# Patient Record
Sex: MALE | Race: WHITE | NOT HISPANIC OR LATINO | Employment: FULL TIME | URBAN - METROPOLITAN AREA
[De-identification: names, ages, dates, MRNs, and addresses within clinical notes are randomized per-mention and may not be internally consistent; named-entity substitution may affect disease eponyms.]

---

## 2017-01-03 ENCOUNTER — OFFICE VISIT (OUTPATIENT)
Dept: OPHTHALMOLOGY | Facility: CLINIC | Age: 52
End: 2017-01-03
Attending: OPHTHALMOLOGY
Payer: COMMERCIAL

## 2017-01-03 DIAGNOSIS — Z94.7 CORNEA REPLACED BY TRANSPLANT: ICD-10-CM

## 2017-01-03 PROCEDURE — 99213 OFFICE O/P EST LOW 20 MIN: CPT | Mod: ZF

## 2017-01-03 PROCEDURE — 92025 CPTRIZED CORNEAL TOPOGRAPHY: CPT | Mod: ZF | Performed by: OPHTHALMOLOGY

## 2017-01-03 ASSESSMENT — SLIT LAMP EXAM - LIDS
COMMENTS: NORMAL
COMMENTS: NORMAL

## 2017-01-03 ASSESSMENT — REFRACTION_WEARINGRX
OS_SPHERE: -10.25
OD_CYLINDER: +3.75
OD_AXIS: 170
OD_SPHERE: -9.00
SPECS_TYPE: PAL
OS_CYLINDER: +5.50
OS_AXIS: 176
OD_ADD: +2.00
OS_ADD: +2.00

## 2017-01-03 ASSESSMENT — VISUAL ACUITY
CORRECTION_TYPE: GLASSES
OS_CC: 20/500
METHOD: SNELLEN - LINEAR
OD_CC: 20/25

## 2017-01-03 ASSESSMENT — TONOMETRY
OD_IOP_MMHG: 17
IOP_METHOD: ICARE
OS_IOP_MMHG: 17

## 2017-01-03 ASSESSMENT — CONF VISUAL FIELD
OD_NORMAL: 1
OS_NORMAL: 1

## 2017-01-03 ASSESSMENT — EXTERNAL EXAM - RIGHT EYE: OD_EXAM: NORMAL

## 2017-01-03 ASSESSMENT — EXTERNAL EXAM - LEFT EYE: OS_EXAM: NORMAL

## 2017-01-03 NOTE — PROGRESS NOTES
Cc:   Chief Complaints and History of Present Illnesses   Patient presents with     Post Op (Ophthalmology) Left Eye     S/p penetrating keratoplasty (PK) left eye 12/30/15     HPI: Paulino Gomez is a 51 year old male who presents with hx of keratoconus now s/p PKP OS (12/30/15)    POHx:  -s/p PKP OS for Keratoconus (12/30/15)    Current Meds:   -prednisolone 1% gtt OS qday  -preservative free artificial tears    Assessment & Plan   Paulino Gomez is a 51 year old male with the following diagnoses:     -s/p PKP OS 12/2015 as above  - wound dehiscence with suture removal, new sutures added 8/30  - K freddie with steepening superonasally; area of re-suturing superotemporally no significant cyl  - continue prednisone once a day  - remove 3 superonasal suts today  - ofloxacin QID x4 days     Revisit in 6 weeks, freddie    Once satisfied with freddie get ball rolling for phacoemulsification / intraocular lens - biometry etc      Ariel Victoria MD MPH   Ophthalmology Resident PGY-3        ~~~~~~~~~~~~~~~~~~~~~~~~~~~~~~~~~~~~~~~~~~~~~~~~~~~~~~~~~~~~~~~~    I have personally examined the patient and agree with the assessment and plan as delineated by the resident / fellow.  I have confirmed the contents of the chief complaint, history of present illness, review of systems, and medical / surgical history sections and edited the note as needed.    Reginald Cabezas MD, MA  Director, Cornea & Anterior Segment  [facility] Department of Ophthalmology & Visual Neuroscience

## 2017-01-03 NOTE — MR AVS SNAPSHOT
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      Your next 10 appointments already scheduled     2017  7:30 AM   RETURN CORNEA with Reginald Cabezas MD   Eye Clinic ([facility])    [address]  [address] [address]  [facility] [address]   [phone]              Who to contact     Please call your clinic at [phone] to:    Ask questions about your health    Make or cancel appointments    Discuss your medicines    Learn about your test results    Speak to your doctor   If you have compliments or concerns about an experience at your clinic, or if you wish to file a complaint, please contact [facility] Patient Relations at [phone] or email us at [email or web address]         Additional Information About Your Visit        MyChart Information     TicketForEventt is an electronic gateway that provides easy, online access to your medical records. With [employer], you can request a clinic appointment, read your test results, renew a prescription or communicate with your care team.     To sign up for TicketForEventt visit the website at [email or web address]   You will be asked to enter the access code listed below, as well as some personal information. Please follow the directions to create your username and password.     Your access code is: [email or web address]  Expires: 3/20/2017  9:00 AM     Your access code will  in 90 days. If you need help or a new code, please contact your [facility] or call  [phone] for assistance.         Blood Pressure from Last 3 Encounters:   02/13/14 113/72   01/30/13 128/82   11/26/12 120/80    Weight from Last 3 Encounters:   02/13/14 92.987 kg (205 lb)   01/30/13 98.975 kg (218 lb 3.2 oz)   11/26/12 103.42 kg (228 lb)              We Performed the Following     Corneal Topography OS (left eye)        Primary Care Provider Office Phone # Fax #    Reese Moser [phone]       [facility] [address]  [facility] [address]        Thank you!     Thank you for choosing EYE CLINIC  for your care. Our goal is always to provide you with excellent care. Hearing back from our patients is one way we can continue to improve our services. Please take a few minutes to complete the written survey that you may receive in the mail after your visit with us. Thank you!             Your Updated Medication List - Protect others around you: Learn how to safely use, store and throw away your medicines at www.disposemymeds.org.          This list is accurate as of: 1/3/17  8:55 AM.  Always use your most recent med list.                   Brand Name Dispense Instructions for use    acetaZOLAMIDE 500 MG 12 hr capsule    DIAMOX SEQUEL    1 capsule    Take 1 capsule (500 mg) by mouth 2 times daily       aspirin 81 MG tablet      Take 1 tablet by mouth daily.       carboxymethylcellul-glycerin 0.5-0.9 % Soln ophthalmic solution    OPTIVE/REFRESH OPTIVE     1 drop as needed       FISH OIL PO      Take 1 tablet by mouth daily       MULTIVITAMIN PO      Take 1 tablet by mouth daily       ofloxacin 0.3 % ophthalmic solution    OCUFLOX    1 Bottle    Place 1 drop Into the left eye 2 times daily       ondansetron 4 MG tablet    ZOFRAN    1 tablet    Take 1 tablet (4 mg) by mouth once as needed for nausea or vomiting       * prednisoLONE acetate 1 % ophthalmic susp    PRED FORTE     Place 1 drop Into the left eye 2 times daily       * prednisoLONE acetate 1 % ophthalmic  susp    PRED FORTE     Place 1 drop Into the left eye daily       simvastatin 40 MG tablet    ZOCOR         VITAMIN C PO      Take 1 tablet by mouth daily       * Notice:  This list has 2 medication(s) that are the same as other medications prescribed for you. Read the directions carefully, and ask your doctor or other care provider to review them with you.

## 2017-01-06 ENCOUNTER — TELEPHONE (OUTPATIENT)
Dept: OPHTHALMOLOGY | Facility: CLINIC | Age: 52
End: 2017-01-06

## 2017-01-06 DIAGNOSIS — Z94.7 HISTORY OF PENETRATING KERATOPLASTY: Primary | ICD-10-CM

## 2017-01-06 NOTE — TELEPHONE ENCOUNTER
Reviewed with cornea clinic  Pt scheduled tomorrow with dr. Broadbent  Pt to increase preservative free artificial tears and may try lubricating eye ointment at night (does not wear contact)  Pt seems satisfied with conversation  Wiliam Galindo RN 10:25 AM 01/06/2017

## 2017-01-06 NOTE — TELEPHONE ENCOUNTER
----- Message from Ebony Hall sent at 1/6/2017  8:44 AM CST -----  Regarding: Eye Redness and Irritation- Call Back  Contact: [phone]  Pt requesting a call back. He stated that since his appt with Dr. Cabezas on 1/3/17, he has been experiencing redness and irritation in his eye and fears cornea rejection. Pt would like to speak with you about this to find out what his next step should be. Thanks.    KP    Please DO NOT send this message and/or reply back to sender.  Call Center Representatives DO NOT respond to messages.

## 2017-01-06 NOTE — TELEPHONE ENCOUNTER
Pt calling with new left eye symptoms since eye last eye visit 3 days ago.  Sutures removed at that visit  Redness/irritation reported  Called left message with direct triage number  Forwarded this note for future addendum after speaking with pt  Wiliam Galindo RN 9:49 AM 01/06/2017

## 2017-01-06 NOTE — TELEPHONE ENCOUNTER
Sutures removed in past caused dehiscence in august this year  First time sutures removed since adding more suture to cornea transplant after dehiscence.  Left eye redness/tearing/no pain, but irritated/no new photophobia, vision about the same, a little scratchy.  Will review with facilitator and call pt back with plan of care  Wiliam Galindo RN 10:04 AM 01/06/2017

## 2017-01-07 ENCOUNTER — OFFICE VISIT (OUTPATIENT)
Dept: OPHTHALMOLOGY | Facility: CLINIC | Age: 52
End: 2017-01-07

## 2017-01-07 DIAGNOSIS — S05.8X2A SUPERFICIAL INJURY OF CORNEA, LEFT, INITIAL ENCOUNTER: ICD-10-CM

## 2017-01-07 DIAGNOSIS — Z94.7 CORNEA REPLACED BY TRANSPLANT: Primary | ICD-10-CM

## 2017-01-07 ASSESSMENT — TONOMETRY
OS_IOP_MMHG: 15
OD_IOP_MMHG: 17
IOP_METHOD: ICARE

## 2017-01-07 ASSESSMENT — VISUAL ACUITY
METHOD: SNELLEN - LINEAR
CORRECTION_TYPE: GLASSES
OS_CC: 6'/200 E
OD_CC: 20/25+2

## 2017-01-07 ASSESSMENT — SLIT LAMP EXAM - LIDS
COMMENTS: NORMAL
COMMENTS: NORMAL

## 2017-01-07 ASSESSMENT — EXTERNAL EXAM - RIGHT EYE: OD_EXAM: NORMAL

## 2017-01-07 ASSESSMENT — EXTERNAL EXAM - LEFT EYE: OS_EXAM: NORMAL

## 2017-01-07 NOTE — PROGRESS NOTES
Chief Complaints and History of Present Illnesses   Patient presents with     Eye Problem Left Eye          He presents for evaluation of his left eye.  He has a history of a corneal transplant.  In August of 2016 he has sutures removed and then had a dehiscence at that time.  This was repaired and he was doing well.  He saw Dr. Cabezas earlier this week and had some sutures removed.  He reports that since the sutures were removed the eye has been red and irritated.  He has been having a lot of tears.  He notes that this was worse over the last several days but this morning seems to be a little bit better.      His vision tends to flucuate some and he has not noticed any significant difference in his vision.      He is on pred forte once a day OS  Ocuflox QID OS  Tears OS    He has also had a retinal detachment on the left side repaired x2.      Assessment & Plan     Paulino Gomez is a 51 year old male with the following diagnoses:   1. 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 Superficial injury of cornea, left, initial encounter       Small abrasions almost totally healed superiorly.  Likely from his previous suture removal.  Otherwise his exam is normal with a white and quiet eye and clear, intact transplant.      PLAN:  - Continue pred forte once daily left eye  - Continue ocuflox QID left eye for 3 more days then stop  - Continue lubrication  - If his symptoms have resolved than keep scheduled follow up with Dr. Cabezas in 6 weeks, if he is still having trouble next week than he will call back.           Attending Physician Attestation:  I have seen and examined this patient.  I have confirmed and edited as necessary the chief complaint(s), history of present illness, review of systems, relevant history, and examination findings as documented by others.  I have personally reviewed the relevant tests, images, and reports as documented above.  I have confirmed and edited as necessary the assessment and plan and agree with  this note.    - Talmage Broadbent MD, PhD 10:10 AM 1/7/2017

## 2017-01-07 NOTE — NURSING NOTE
Chief Complaints and History of Present Illnesses   Patient presents with     Eye Problem Left Eye     HPI    Affected eye(s):  Left   Symptoms:     Blurred vision   Redness   Tearing   Photophobia         Do you have eye pain now?:  No      Comments:  Had sutures removed 1/3/17. Lots of tearing, redness and blurry LE, better this AM. Has been using ofloxacin qid, pred qd and AT prn     Routine labs ordered; CBC, comprehensive metabolic panel, fasting lipid panel, urinalysis.

## 2017-01-08 RX ORDER — PREDNISOLONE ACETATE 10 MG/ML
1 SUSPENSION/ DROPS OPHTHALMIC DAILY
Qty: 5 ML | Refills: 11 | Status: SHIPPED | OUTPATIENT
Start: 2017-01-08 | End: 2017-11-02 | Stop reason: DRUGHIGH

## 2017-02-21 ENCOUNTER — OFFICE VISIT (OUTPATIENT)
Dept: OPHTHALMOLOGY | Facility: CLINIC | Age: 52
End: 2017-02-21
Attending: OPHTHALMOLOGY
Payer: COMMERCIAL

## 2017-02-21 DIAGNOSIS — Z94.7 S/P PKP (PENETRATING KERATOPLASTY): Primary | ICD-10-CM

## 2017-02-21 PROCEDURE — 92025 CPTRIZED CORNEAL TOPOGRAPHY: CPT | Mod: ZF | Performed by: OPHTHALMOLOGY

## 2017-02-21 PROCEDURE — 99213 OFFICE O/P EST LOW 20 MIN: CPT | Mod: ZF

## 2017-02-21 ASSESSMENT — TONOMETRY
IOP_METHOD: ICARE
OD_IOP_MMHG: 25
OS_IOP_MMHG: 18

## 2017-02-21 ASSESSMENT — VISUAL ACUITY
OS_CC: 3'/200 E
OD_CC: 20/25
CORRECTION_TYPE: GLASSES
OD_CC+: -2+2
METHOD: SNELLEN - LINEAR

## 2017-02-21 ASSESSMENT — REFRACTION_WEARINGRX
OS_SPHERE: -10.25
OS_AXIS: 176
SPECS_TYPE: PAL
OD_CYLINDER: +3.75
OD_SPHERE: -9.00
OS_ADD: +2.00
OD_AXIS: 170
OD_ADD: +2.00
OS_CYLINDER: +5.50

## 2017-02-21 ASSESSMENT — SLIT LAMP EXAM - LIDS
COMMENTS: NORMAL
COMMENTS: NORMAL

## 2017-02-21 ASSESSMENT — EXTERNAL EXAM - RIGHT EYE: OD_EXAM: NORMAL

## 2017-02-21 ASSESSMENT — EXTERNAL EXAM - LEFT EYE: OS_EXAM: NORMAL

## 2017-02-21 NOTE — PROGRESS NOTES
Cc:   Chief Complaints and History of Present Illnesses   Patient presents with     Post Op (Ophthalmology) Left Eye     S/p penetrating keratoplasty (PK) left eye 12/30/15     HPI: Paulino Gomez is a 51 year old male who presents with hx of keratoconus now s/p PKP OS (12/30/15)      Interval history: Saw Dr Broadbent in January for some increased redness and irritation after suture removal, graft looked reportedly clear and patient was extended on ofloxacin drops for a few days. Redness soon resolved and patient has done well since. Vision has mildly declined over time in both eyes, especially at near, at night, and in bright lighting conditions.      POHx:  -s/p PKP OS for Keratoconus (12/30/15)    Current Meds:   -prednisolone 1% gtt OS qday  -preservative free artificial tears    Assessment & Plan   Paulino Gomez is a 51 year old male with the following diagnoses:     -s/p PKP OS 12/2015 as above  - wound dehiscence with suture removal, new sutures added 8/30  -additional sutures removed 1/3/17- K freddie with steepening superiorly, improved from August 2016  - continue prednisone once a day    add'l sutures x 2 removed today      Significant PSC cataract OS  History of retinal detachments but 20/150 range vision at time of last retina visit with Dr Novak in Sept 2015    Plan for phacoemulsification / intraocular lens in April     Extended R/B/A   Plan for -6.50 target to prevent severe aniso with right eye    Topical, may need to suture wound    Check one final time for freddie etc late March, and do biometry that day if freddie good.    manifest refraction today right eye to help with work function       Jules Loya MD  PGY3, Dept of Ophthalmology      ~~~~~~~~~~~~~~~~~~~~~~~~~~~~~~~~~~~~~~~~~~~~~~~~~~~~~~~~~~~~~~~~    I have personally examined the patient and agree with the assessment and plan as delineated by the resident / fellow.  I have confirmed the contents of the chief complaint, history of  present illness, review of systems, and medical / surgical history sections and edited the note as needed.    Reginald Cabezas MD, MA  Director, Cornea & Anterior Segment  [facility] Department of Ophthalmology & Visual Neuroscience

## 2017-02-21 NOTE — NURSING NOTE
Chief Complaints and History of Present Illnesses   Patient presents with     Follow Up For     6 week f/u; freddie OS     HPI    Affected eye(s):  Left   Symptoms:     Decreased vision   Difficulty with reading      Duration:  6 weeks      Do you have eye pain now?:  No      Comments:  6 week f/u with freddie OS - stitches removed last visit (1/3/17)  Pt states his eye was red after stitches were removed - saw Dr. Broadbent for this  S/p PKP for keratoconus 2015  Night vision while driving appears to be worse, per patient - pt hadn't been driving for quite some time  Notes difficulty with reading and believes overall vision may be slightly worse  Pt does get a pounding headache/eye fatigue by the end of the day    Ocular meds:  Prednisone QD, left eye  PFAT's PRN    Radha Irvin COA 7:34 AM February 21, 2017

## 2017-02-21 NOTE — MR AVS SNAPSHOT
After Visit Summary   2/21/2017    Paulino Gomez    MRN: [id number]           Patient Information     Date Of Birth          1965        Visit Information        Provider Department      2/21/2017 7:30 AM Reginald Cabezas MD [facility]        Today's Diagnoses     S/P PKP (penetrating keratoplasty)    -  1    PSC (posterior subcapsular cataract)           Follow-ups after your visit        Follow-up notes from your care team     Return in about 4 weeks (around 3/21/2017) for topography os , biometry os .      Your next 10 appointments already scheduled     Mar 28, 2017  8:00 AM CDT   RETURN CORNEA with Reginald Cabezas MD   Eye Clinic ([facility])    [address]  [address]  [address]  [facility] [address] [phone]            Apr 11, 2017  1:30 PM CDT   Post-Op with Reginald Cabezas MD   Eye Clinic ([facility])    [address]  [address]  [address]  [facility] [id number] [phone]            Apr 18, 2017  7:45 AM CDT   Post-Op with Reginald Cabezas MD   Eye Clinic ([facility])    [address]  [address]  [address]  [facility] [address] [phone]              Future tests that were ordered for you today     Open Future Orders        Priority Expected Expires Ordered    Corneal Topography OS (left eye) Routine  8/21/2018 2/21/2017    IOL Biometry w/ IOL calc OU (both eye) Routine  8/21/2018 2/21/2017            Who to contact     Please call your clinic at [phone] to:    Ask questions about your health    Make or cancel appointments    Discuss your medicines    Learn about your test results    Speak to your doctor   If you have compliments or concerns about an experience at your clinic, or if you wish to file a complaint, please contact [facility] Patient Relations at [phone] or email us at [email or web address]          Additional Information About Your Visit        [employer]ala International Information     [employer] is an electronic gateway that provides easy, online access to your medical records. With [employer], you can request a clinic appointment, read your test results, renew a prescription or communicate with your care team.     To sign up for [employer] visit the website at [email or web address] or [email or web address]   You will be asked to enter the access code listed below, as well as some personal information. Please follow the directions to create your username and password.     Your access code is: [email or web address]  Expires: 3/20/2017  9:00 AM     Your access code will  in 90 days. If you need help or a new code, please contact your [facility] or call [phone] for assistance.        Care EveryWhere ID     This is your Care EveryWhere ID. This could be used by other organizations to access your [place] medical records  [id number]         Blood Pressure from Last 3 Encounters:   14 113/72   13 128/82   12 120/80    Weight from Last 3 Encounters:   14 93 kg (205 lb)   13 99 kg (218 lb 3.2 oz)   12 103.4 kg (228 lb)              We Performed the Following     Corneal Topography OS (left eye)     Dorota-Operative Worksheet        Primary Care Provider Office Phone # Fax #    Reese Moser [phone]       [facility] [address] [facility] [address]        Thank you!     Thank you for choosing EYE CLINIC  for your care. Our goal is always to provide you with excellent care. Hearing back from our patients is one way we can continue to improve our services. Please take a few minutes to complete the written survey that you may receive in the mail after your visit with us. Thank you!             Your Updated Medication List - Protect others around you: Learn how to safely use, store and throw away your medicines at www.disposemymeds.org.          This list  is accurate as of: 2/21/17  9:07 AM.  Always use your most recent med list.                   Brand Name Dispense Instructions for use    aspirin 81 MG tablet      Take 1 tablet by mouth daily.       carboxymethylcellul-glycerin 0.5-0.9 % Soln ophthalmic solution    OPTIVE/REFRESH OPTIVE     1 drop as needed       FISH OIL PO      Take 1 tablet by mouth daily       MULTIVITAMIN PO      Take 1 tablet by mouth daily       ofloxacin 0.3 % ophthalmic solution    OCUFLOX    1 Bottle    Place 1 drop Into the left eye 2 times daily       ondansetron 4 MG tablet    ZOFRAN    1 tablet    Take 1 tablet (4 mg) by mouth once as needed for nausea or vomiting       * prednisoLONE acetate 1 % ophthalmic susp    PRED FORTE     Place 1 drop Into the left eye 2 times daily       * prednisoLONE acetate 1 % ophthalmic susp    PRED FORTE     Place 1 drop Into the left eye daily       * prednisoLONE acetate 1 % ophthalmic susp    PRED FORTE    5 mL    Place 1 drop Into the left eye daily       simvastatin 40 MG tablet    ZOCOR         VITAMIN C PO      Take 1 tablet by mouth daily       * Notice:  This list has 3 medication(s) that are the same as other medications prescribed for you. Read the directions carefully, and ask your doctor or other care provider to review them with you.

## 2017-03-28 ENCOUNTER — OFFICE VISIT (OUTPATIENT)
Dept: OPHTHALMOLOGY | Facility: CLINIC | Age: 52
End: 2017-03-28
Attending: OPHTHALMOLOGY
Payer: COMMERCIAL

## 2017-03-28 DIAGNOSIS — Z94.7 S/P PKP (PENETRATING KERATOPLASTY): ICD-10-CM

## 2017-03-28 PROCEDURE — 92025 CPTRIZED CORNEAL TOPOGRAPHY: CPT | Mod: ZF | Performed by: OPHTHALMOLOGY

## 2017-03-28 PROCEDURE — 99213 OFFICE O/P EST LOW 20 MIN: CPT | Mod: 25,ZF

## 2017-03-28 ASSESSMENT — REFRACTION_WEARINGRX
OD_CYLINDER: +3.75
OS_ADD: +2.00
SPECS_TYPE: PAL
OS_SPHERE: -10.25
OD_SPHERE: -9.00
OD_AXIS: 170
OD_ADD: +2.00
OS_CYLINDER: +5.50
OS_AXIS: 176

## 2017-03-28 ASSESSMENT — TONOMETRY
OD_IOP_MMHG: 18
IOP_METHOD: ICARE
OS_IOP_MMHG: 17

## 2017-03-28 ASSESSMENT — VISUAL ACUITY
OS_CC: 3/200E
OD_CC+: -1
OD_CC: 20/25
CORRECTION_TYPE: GLASSES
METHOD: SNELLEN - LINEAR

## 2017-03-28 ASSESSMENT — SLIT LAMP EXAM - LIDS
COMMENTS: NORMAL
COMMENTS: NORMAL

## 2017-03-28 ASSESSMENT — EXTERNAL EXAM - RIGHT EYE: OD_EXAM: NORMAL

## 2017-03-28 ASSESSMENT — EXTERNAL EXAM - LEFT EYE: OS_EXAM: NORMAL

## 2017-03-28 NOTE — MR AVS SNAPSHOT
After Visit Summary   3/28/2017    Paulino Gomez    MRN: [id number]           Patient Information     Date Of Birth          1965        Visit Information        Provider Department      3/28/2017 8:00 AM Reginald Cabezas MD [facility]        Today's Diagnoses     S/P PKP (penetrating keratoplasty)        PSC (posterior subcapsular cataract)           Follow-ups after your visit        Follow-up notes from your care team     Return in about 3 weeks (around 4/18/2017) for corneal topography, IOL calcs including immersion.      Your next 10 appointments already scheduled     Apr 10, 2017   Procedure with Reginald Cabezas MD   [facility] (--)    [address]  [facility] [address]   [phone]            Apr 18, 2017  7:45 AM CDT   RETURN CORNEA with Reginald Cabezas MD   Eye Clinic ([facility])    [address]  [address]  [address]  [facility] [address] [phone]              Who to contact     Please call your clinic at [phone] to:    Ask questions about your health    Make or cancel appointments    Discuss your medicines    Learn about your test results    Speak to your doctor   If you have compliments or concerns about an experience at your clinic, or if you wish to file a complaint, please contact [facility] Patient Relations at [phone] or email us at [email or web address]         Additional Information About Your Visit        MyChart Information     AudioTript is an electronic gateway that provides easy, online access to your medical records. With [employer], you can request a clinic appointment, read your test results, renew a prescription or communicate with your care team.     To sign up for AudioTript visit the website at [email or web address]   You will be asked to enter the access code listed below, as well as some personal information. Please follow the  directions to create your username and password.     Your access code is: [email or web address]  Expires: 2017  8:30 AM     Your access code will  in 90 days. If you need help or a new code, please contact your [facility] or call [phone] for assistance.        Care EveryWhere ID     This is your Care EveryWhere ID. This could be used by other organizations to access your [place] medical records  [id number]         Blood Pressure from Last 3 Encounters:   14 113/72   13 128/82   12 120/80    Weight from Last 3 Encounters:   14 93 kg (205 lb)   13 99 kg (218 lb 3.2 oz)   12 103.4 kg (228 lb)              We Performed the Following     Corneal Topography OS (left eye)        Primary Care Provider Office Phone # Fax #    Reese Moser [phone]       [facility] [address] [facility] [address]        Thank you!     Thank you for choosing EYE CLINIC  for your care. Our goal is always to provide you with excellent care. Hearing back from our patients is one way we can continue to improve our services. Please take a few minutes to complete the written survey that you may receive in the mail after your visit with us. Thank you!             Your Updated Medication List - Protect others around you: Learn how to safely use, store and throw away your medicines at www.disposemymeds.org.          This list is accurate as of: 3/28/17  9:20 AM.  Always use your most recent med list.                   Brand Name Dispense Instructions for use    aspirin 81 MG tablet      Take 1 tablet by mouth daily.       carboxymethylcellul-glycerin 0.5-0.9 % Soln ophthalmic solution    OPTIVE/REFRESH OPTIVE     1 drop as needed       FISH OIL PO      Take 1 tablet by mouth daily       MULTIVITAMIN PO      Take 1 tablet by mouth daily       ofloxacin 0.3 % ophthalmic solution    OCUFLOX    1 Bottle    Place 1 drop Into the left eye  2 times daily       ondansetron 4 MG tablet    ZOFRAN    1 tablet    Take 1 tablet (4 mg) by mouth once as needed for nausea or vomiting       * prednisoLONE acetate 1 % ophthalmic susp    PRED FORTE     Place 1 drop Into the left eye 2 times daily       * prednisoLONE acetate 1 % ophthalmic susp    PRED FORTE     Place 1 drop Into the left eye daily       * prednisoLONE acetate 1 % ophthalmic susp    PRED FORTE    5 mL    Place 1 drop Into the left eye daily       simvastatin 40 MG tablet    ZOCOR         VITAMIN C PO      Take 1 tablet by mouth daily       * Notice:  This list has 3 medication(s) that are the same as other medications prescribed for you. Read the directions carefully, and ask your doctor or other care provider to review them with you.

## 2017-03-28 NOTE — Clinical Note
Please cancel Mr. Gomez's surgery for 4/10 and the one day post op, will likely reschedule for early May pending next visit.

## 2017-03-28 NOTE — NURSING NOTE
Chief Complaints and History of Present Illnesses   Patient presents with     Follow Up For     1 month follow up -s/p PKP OS for Keratoconus (12/30/15)     HPI    Affected eye(s):  Both   Symptoms:     No floaters   No flashes   No redness         Do you have eye pain now?:  No      Comments:  Pt states vision is the same as last visit. Pt still having dryness and experiencing headaches, but nothing new.      Tirso BAR March 28, 2017 8:22 AM

## 2017-04-03 NOTE — PROGRESS NOTES
Cc:   Chief Complaints and History of Present Illnesses   Patient presents with     Post Op (Ophthalmology) Left Eye     S/p penetrating keratoplasty (PK) left eye 12/30/15     HPI: Paulino Gomez is a 51 year old male who presents with hx of keratoconus now s/p PKP OS (12/30/15)      Interval history: Saw Dr Broadbent in January for some increased redness and irritation after suture removal, graft looked reportedly clear and patient was extended on ofloxacin drops for a few days. Redness soon resolved and patient has done well since. Vision has mildly declined over time in both eyes, especially at near, at night, and in bright lighting conditions.      POHx:  -s/p PKP OS for Keratoconus (12/30/15)    Current Meds:   -prednisolone 1% gtt OS qday  -preservative free artificial tears    Assessment & Plan   Paulino Gomez is a 51 year old male with the following diagnoses:     -s/p PKP OS 12/2015 as above  - wound dehiscence with suture removal, new sutures added 8/30    add'l sutures removed today      Significant PSC cataract OS  History of retinal detachments but 20/150 range vision at time of last retina visit with Dr Novak in Sept 2015    Plan for phacoemulsification / intraocular lens when astigmatism settled     Extended R/B/A last visit   Plan for -6.50 target to prevent severe aniso with right eye    Topical, may need to suture wound    Repeat freddie in 2-4 weeks  Cataract surgery +/- astigmatic keratotomy soon       ~~~~~~~~~~~~~~~~~~~~~~~~~~~~~~~~~~~~~~~~~~~~~~~~~~~~~~~~~~~~~~~~    Complete documentation of historical and exam elements from today's encounter can be found in the full encounter summary report (not reduplicated in this progress note). I personally obtained the chief complaint(s) and history of present illness.  I confirmed and edited as necessary the review of systems, past medical/surgical history, family history, social history, and examination findings as documented by others.  I examined  the patient myself, and I personally reviewed the relevant tests, images, and reports as documented above. I formulated and edited as necessary the assessment and plan and discussed the findings and management plan with the patient and family.     Reginald Cabezas MD, MA  Director, Cornea & Anterior Segment  [facility] Department of Ophthalmology & Visual Neuroscience

## 2017-04-18 ENCOUNTER — OFFICE VISIT (OUTPATIENT)
Dept: OPHTHALMOLOGY | Facility: CLINIC | Age: 52
End: 2017-04-18
Attending: OPHTHALMOLOGY
Payer: COMMERCIAL

## 2017-04-18 DIAGNOSIS — Z94.7 POST CORNEAL TRANSPLANT: ICD-10-CM

## 2017-04-18 PROCEDURE — 76519 ECHO EXAM OF EYE: CPT | Mod: ZF | Performed by: OPHTHALMOLOGY

## 2017-04-18 PROCEDURE — 99212 OFFICE O/P EST SF 10 MIN: CPT | Mod: ZF

## 2017-04-18 ASSESSMENT — SLIT LAMP EXAM - LIDS
COMMENTS: NORMAL
COMMENTS: NORMAL

## 2017-04-18 ASSESSMENT — VISUAL ACUITY
OD_CC: 20/25
OS_CC: CF @ 3'
CORRECTION_TYPE: GLASSES
METHOD: SNELLEN - LINEAR

## 2017-04-18 ASSESSMENT — CONF VISUAL FIELD
OS_NORMAL: 1
OD_NORMAL: 1

## 2017-04-18 ASSESSMENT — TONOMETRY
OS_IOP_MMHG: 20
IOP_METHOD: ICARE
OD_IOP_MMHG: 16

## 2017-04-18 ASSESSMENT — EXTERNAL EXAM - RIGHT EYE: OD_EXAM: NORMAL

## 2017-04-18 ASSESSMENT — EXTERNAL EXAM - LEFT EYE: OS_EXAM: NORMAL

## 2017-04-18 NOTE — MR AVS SNAPSHOT
After Visit Summary   4/18/2017    Paulino Gomez    MRN: [id number]           Patient Information     Date Of Birth          1965        Visit Information        Provider Department      4/18/2017 7:45 AM Reginald Cabezas MD [facility]        Today's Diagnoses     PSC (posterior subcapsular cataract)    -  1       Follow-ups after your visit        Your next 10 appointments already scheduled     May 03, 2017   Procedure with Reginald Cabezas MD   [facility] ([facility])    [address] [place] [address]  5th [facility] [address] [phone]           Located in the Clinics and Surgery Center at [address] [place] [address].   parking is very convenient and highly recommended.  is a $6 flat rate fee.  Both  and self parkers should enter the main arrival plaza from [facility]; parking attendants will direct you based on your parking preference.            May 04, 2017  2:00 PM CDT   Post-Op with Reginald Cabezas MD   Eye Clinic ([facility])    [address]  [address]  [address] [address]  [facility] [address] [phone]            May 11, 2017  2:00 PM CDT   Post-Op with Reginald Cabezas MD   Eye Clinic ([facility])    [address]  [address]  [address] [address]  [facility] [address] [phone]              Who to contact     Please call your clinic at [phone] to:    Ask questions about your health    Make or cancel appointments    Discuss your medicines    Learn about your test results    Speak to your doctor   If you have compliments or concerns about an experience at your clinic, or if you wish to file a complaint, please contact [facility] Patient Relations at [phone] or email us at [email or web address]         Additional Information About Your Visit        MyChart Information     MyChart is  an electronic gateway that provides easy, online access to your medical records. With [employer], you can request a clinic appointment, read your test results, renew a prescription or communicate with your care team.     To sign up for [employer] visit the website at [email or web address]   You will be asked to enter the access code listed below, as well as some personal information. Please follow the directions to create your username and password.     Your access code is: [email or web address]  Expires: 2017  8:30 AM     Your access code will  in 90 days. If you need help or a new code, please contact your [facility] or call [phone] for assistance.        Care EveryWhere ID     This is your Care EveryWhere ID. This could be used by other organizations to access your [place] medical records  [id number]         Blood Pressure from Last 3 Encounters:   14 113/72   13 128/82   12 120/80    Weight from Last 3 Encounters:   14 93 kg (205 lb)   13 99 kg (218 lb 3.2 oz)   12 103.4 kg (228 lb)              We Performed the Following     IOL Biometry w/ IOL calc OU (both eye)     Dorota-Operative Worksheet        Primary Care Provider Office Phone # Fax #    Reese Moser [phone]       [facility] [address] [facility] [address]        Thank you!     Thank you for choosing EYE CLINIC  for your care. Our goal is always to provide you with excellent care. Hearing back from our patients is one way we can continue to improve our services. Please take a few minutes to complete the written survey that you may receive in the mail after your visit with us. Thank you!             Your Updated Medication List - Protect others around you: Learn how to safely use, store and throw away your medicines at www.disposemymeds.org.          This list is accurate as of: 17  9:44 AM.  Always use your most recent med list.                    Brand Name Dispense Instructions for use    aspirin 81 MG tablet      Take 1 tablet by mouth daily.       carboxymethylcellul-glycerin 0.5-0.9 % Soln ophthalmic solution    OPTIVE/REFRESH OPTIVE     1 drop as needed       FISH OIL PO      Take 1 tablet by mouth daily       MULTIVITAMIN PO      Take 1 tablet by mouth daily       ofloxacin 0.3 % ophthalmic solution    OCUFLOX    1 Bottle    Place 1 drop Into the left eye 2 times daily       ondansetron 4 MG tablet    ZOFRAN    1 tablet    Take 1 tablet (4 mg) by mouth once as needed for nausea or vomiting       * prednisoLONE acetate 1 % ophthalmic susp    PRED FORTE     Place 1 drop Into the left eye 2 times daily       * prednisoLONE acetate 1 % ophthalmic susp    PRED FORTE     Place 1 drop Into the left eye daily       * prednisoLONE acetate 1 % ophthalmic susp    PRED FORTE    5 mL    Place 1 drop Into the left eye daily       simvastatin 40 MG tablet    ZOCOR         VITAMIN C PO      Take 1 tablet by mouth daily       * Notice:  This list has 3 medication(s) that are the same as other medications prescribed for you. Read the directions carefully, and ask your doctor or other care provider to review them with you.

## 2017-04-18 NOTE — NURSING NOTE
Chief Complaints and History of Present Illnesses   Patient presents with     Follow Up For     follow up + freddie     HPI    Affected eye(s):  Both   Symptoms:        Duration:  3 weeks      Do you have eye pain now?:  No      Comments:  Follow up + freddie  pred cory Huerta COA 7:40 AM April 18, 2017

## 2017-04-18 NOTE — PROGRESS NOTES
Cc:   Chief Complaints and History of Present Illnesses   Patient presents with     Post Op (Ophthalmology) Left Eye     S/p penetrating keratoplasty (PK) left eye 12/30/15     HPI: Paulino Gomez is a 52 year old male who presents with hx of keratoconus now s/p PKP OS (12/30/15)    Interval history: No changes in vision. Chronic fatigue and headache at end of day (working on computer).     POHx:  -s/p PKP OS for Keratoconus (12/30/15)    Current Meds:   -prednisolone 1% gtt OS qday  -preservative free artificial tears    Assessment & Plan   Paulino Gomez is a 52 year old male with the following diagnoses:     - s/p PKP OS 12/2015 as above  - wound dehiscence with suture removal, new sutures added 8/30    Significant PSC cataract OS  History of retinal detachments but 20/150 range vision at time of last retina visit with Dr Novak in Sept 2015    OK to proceed with phacoemulsification / intraocular lens      Extended R/B/A last visit   Plan for -6.50 target to prevent severe aniso with right eye    Topical, may need to suture wound    Repeat freddie today shows similar irregular astigmatism of the left eye   add'l suture removal not likely to be beneficial    Postoperatively can consider Rigid gas permeable lens wear vs astigmatic keratotomy    Tigre Holbrook MD  Ophthalmology resident, PGY-3      ~~~~~~~~~~~~~~~~~~~~~~~~~~~~~~~~~~~~~~~~~~~~~~~~~~~~~~~~~~~~~~~~    Complete documentation of historical and exam elements from today's encounter can be found in the full encounter summary report (not reduplicated in this progress note). I personally obtained the chief complaint(s) and history of present illness.  I confirmed and edited as necessary the review of systems, past medical/surgical history, family history, social history, and examination findings as documented by others.  I examined the patient myself, and I personally reviewed the relevant tests, images, and reports as documented above. I formulated and edited as  necessary the assessment and plan and discussed the findings and management plan with the patient and family.     Reginald Cabezas MD, MA  Director, Cornea & Anterior Segment  [facility] Department of Ophthalmology & Visual Neuroscience

## 2017-05-02 ENCOUNTER — ANESTHESIA EVENT (OUTPATIENT)
Dept: SURGERY | Facility: AMBULATORY SURGERY CENTER | Age: 52
End: 2017-05-02

## 2017-05-03 ENCOUNTER — HOSPITAL ENCOUNTER (OUTPATIENT)
Facility: AMBULATORY SURGERY CENTER | Age: 52
End: 2017-05-03
Attending: OPHTHALMOLOGY

## 2017-05-03 ENCOUNTER — ANESTHESIA (OUTPATIENT)
Dept: SURGERY | Facility: AMBULATORY SURGERY CENTER | Age: 52
End: 2017-05-03

## 2017-05-03 VITALS
SYSTOLIC BLOOD PRESSURE: 132 MMHG | OXYGEN SATURATION: 97 % | TEMPERATURE: 97.8 F | HEIGHT: 70 IN | WEIGHT: 210 LBS | RESPIRATION RATE: 16 BRPM | BODY MASS INDEX: 30.06 KG/M2 | HEART RATE: 99 BPM | DIASTOLIC BLOOD PRESSURE: 90 MMHG

## 2017-05-03 DIAGNOSIS — H25.042 POSTERIOR SUBCAPSULAR AGE-RELATED CATARACT, LEFT EYE: Primary | ICD-10-CM

## 2017-05-03 DIAGNOSIS — Z94.7 HISTORY OF PENETRATING KERATOPLASTY: ICD-10-CM

## 2017-05-03 DEVICE — IMPLANTABLE DEVICE: Type: IMPLANTABLE DEVICE | Site: EYE | Status: FUNCTIONAL

## 2017-05-03 RX ORDER — SODIUM CHLORIDE, SODIUM LACTATE, POTASSIUM CHLORIDE, CALCIUM CHLORIDE 600; 310; 30; 20 MG/100ML; MG/100ML; MG/100ML; MG/100ML
INJECTION, SOLUTION INTRAVENOUS CONTINUOUS
Status: DISCONTINUED | OUTPATIENT
Start: 2017-05-03 | End: 2017-05-04 | Stop reason: HOSPADM

## 2017-05-03 RX ORDER — GABAPENTIN 300 MG/1
300 CAPSULE ORAL ONCE
Status: COMPLETED | OUTPATIENT
Start: 2017-05-03 | End: 2017-05-03

## 2017-05-03 RX ORDER — NALOXONE HYDROCHLORIDE 0.4 MG/ML
.1-.4 INJECTION, SOLUTION INTRAMUSCULAR; INTRAVENOUS; SUBCUTANEOUS
Status: DISCONTINUED | OUTPATIENT
Start: 2017-05-03 | End: 2017-05-04 | Stop reason: HOSPADM

## 2017-05-03 RX ORDER — ONDANSETRON 4 MG/1
4 TABLET, ORALLY DISINTEGRATING ORAL EVERY 30 MIN PRN
Status: DISCONTINUED | OUTPATIENT
Start: 2017-05-03 | End: 2017-05-04 | Stop reason: HOSPADM

## 2017-05-03 RX ORDER — FENTANYL CITRATE 50 UG/ML
25-50 INJECTION, SOLUTION INTRAMUSCULAR; INTRAVENOUS EVERY 5 MIN PRN
Status: DISCONTINUED | OUTPATIENT
Start: 2017-05-03 | End: 2017-05-04 | Stop reason: HOSPADM

## 2017-05-03 RX ORDER — ONDANSETRON 2 MG/ML
4 INJECTION INTRAMUSCULAR; INTRAVENOUS EVERY 30 MIN PRN
Status: DISCONTINUED | OUTPATIENT
Start: 2017-05-03 | End: 2017-05-04 | Stop reason: HOSPADM

## 2017-05-03 RX ORDER — ACETAMINOPHEN 325 MG/1
975 TABLET ORAL ONCE
Status: COMPLETED | OUTPATIENT
Start: 2017-05-03 | End: 2017-05-03

## 2017-05-03 RX ORDER — FENTANYL CITRATE 50 UG/ML
INJECTION, SOLUTION INTRAMUSCULAR; INTRAVENOUS PRN
Status: DISCONTINUED | OUTPATIENT
Start: 2017-05-03 | End: 2017-05-03

## 2017-05-03 RX ORDER — ONDANSETRON 2 MG/ML
INJECTION INTRAMUSCULAR; INTRAVENOUS PRN
Status: DISCONTINUED | OUTPATIENT
Start: 2017-05-03 | End: 2017-05-03

## 2017-05-03 RX ORDER — TETRACAINE HYDROCHLORIDE 5 MG/ML
SOLUTION OPHTHALMIC PRN
Status: DISCONTINUED | OUTPATIENT
Start: 2017-05-03 | End: 2017-05-03 | Stop reason: HOSPADM

## 2017-05-03 RX ORDER — MEPERIDINE HYDROCHLORIDE 25 MG/ML
12.5 INJECTION INTRAMUSCULAR; INTRAVENOUS; SUBCUTANEOUS
Status: DISCONTINUED | OUTPATIENT
Start: 2017-05-03 | End: 2017-05-04 | Stop reason: HOSPADM

## 2017-05-03 RX ORDER — BALANCED SALT SOLUTION 6.4; .75; .48; .3; 3.9; 1.7 MG/ML; MG/ML; MG/ML; MG/ML; MG/ML; MG/ML
SOLUTION OPHTHALMIC PRN
Status: DISCONTINUED | OUTPATIENT
Start: 2017-05-03 | End: 2017-05-03 | Stop reason: HOSPADM

## 2017-05-03 RX ORDER — KETOROLAC TROMETHAMINE 5 MG/ML
1 SOLUTION OPHTHALMIC 4 TIMES DAILY
Qty: 5 ML | Refills: 0 | Status: SHIPPED | OUTPATIENT
Start: 2017-05-03 | End: 2017-06-06

## 2017-05-03 RX ORDER — TROPICAMIDE 10 MG/ML
1 SOLUTION/ DROPS OPHTHALMIC
Status: COMPLETED | OUTPATIENT
Start: 2017-05-03 | End: 2017-05-03

## 2017-05-03 RX ORDER — LIDOCAINE HYDROCHLORIDE 10 MG/ML
INJECTION, SOLUTION EPIDURAL; INFILTRATION; INTRACAUDAL; PERINEURAL PRN
Status: DISCONTINUED | OUTPATIENT
Start: 2017-05-03 | End: 2017-05-03 | Stop reason: HOSPADM

## 2017-05-03 RX ORDER — OFLOXACIN 3 MG/ML
1 SOLUTION/ DROPS OPHTHALMIC 4 TIMES DAILY
Qty: 1 BOTTLE | Refills: 0 | Status: SHIPPED | OUTPATIENT
Start: 2017-05-03 | End: 2017-06-06

## 2017-05-03 RX ORDER — PHENYLEPHRINE HYDROCHLORIDE 25 MG/ML
1 SOLUTION/ DROPS OPHTHALMIC
Status: COMPLETED | OUTPATIENT
Start: 2017-05-03 | End: 2017-05-03

## 2017-05-03 RX ORDER — PROPARACAINE HYDROCHLORIDE 5 MG/ML
1 SOLUTION/ DROPS OPHTHALMIC ONCE
Status: COMPLETED | OUTPATIENT
Start: 2017-05-03 | End: 2017-05-03

## 2017-05-03 RX ADMIN — GABAPENTIN 300 MG: 300 CAPSULE ORAL at 07:16

## 2017-05-03 RX ADMIN — FENTANYL CITRATE 50 MCG: 50 INJECTION, SOLUTION INTRAMUSCULAR; INTRAVENOUS at 08:46

## 2017-05-03 RX ADMIN — ONDANSETRON 4 MG: 2 INJECTION INTRAMUSCULAR; INTRAVENOUS at 08:46

## 2017-05-03 RX ADMIN — PROPARACAINE HYDROCHLORIDE 1 DROP: 5 SOLUTION/ DROPS OPHTHALMIC at 07:04

## 2017-05-03 RX ADMIN — ACETAMINOPHEN 975 MG: 325 TABLET ORAL at 07:16

## 2017-05-03 RX ADMIN — TROPICAMIDE 1 DROP: 10 SOLUTION/ DROPS OPHTHALMIC at 07:24

## 2017-05-03 RX ADMIN — PHENYLEPHRINE HYDROCHLORIDE 1 DROP: 25 SOLUTION/ DROPS OPHTHALMIC at 07:04

## 2017-05-03 RX ADMIN — PHENYLEPHRINE HYDROCHLORIDE 1 DROP: 25 SOLUTION/ DROPS OPHTHALMIC at 07:16

## 2017-05-03 RX ADMIN — TROPICAMIDE 1 DROP: 10 SOLUTION/ DROPS OPHTHALMIC at 07:04

## 2017-05-03 RX ADMIN — PHENYLEPHRINE HYDROCHLORIDE 1 DROP: 25 SOLUTION/ DROPS OPHTHALMIC at 07:24

## 2017-05-03 RX ADMIN — TROPICAMIDE 1 DROP: 10 SOLUTION/ DROPS OPHTHALMIC at 07:16

## 2017-05-03 NOTE — OP NOTE
OPHTHALMOLOGY OPERATIVE REPORT    PATIENT NAME: Paulino Gomez  DATE: 5/3/2017     SURGEON:  Reginald Cabezas MD  ASSISTANT  none    PREOP DIAGNOSIS:     1.visually significant cataract left eye, complex requiring trypan blue for enhanced capsular visualization  2. Keratoconus both eyes   3. S/p penetrating keratoplasty (PK) left eye     POSTOP DIAGNOSIS: same    ANESTHESIA: MAC topical  COMPLICATIONS: none    LENS SPECIFICATIONS: PCB00 18.0 Diopters    INDICATION FOR PROCEDURE  The patient has a visually-significant cataract that has been affecting their activities of daily living. The risks including, but not limited to infection, loss of vision, loss of eye, need for more surgery, and bleeding, along with the benefits, alternatives, expectations, and the procedure itself were discussed at length with the patient who wished to proceed with surgery.    DESCRIPTION OF PROCEDURE  In the preoperative suite, the patient was identified, the surgical site marked and informed consent was obtained. The patient was then brought back to the operative suite where the appropriate anesthesia monitors were connected. The patient's eye was prepped and draped in the usual sterile fashion for ophthalmic surgery.    A scleral fixation ring and a supersharp blade were used to make a paracentesis incision. Aqueous was replaced with preservative free lidocaine.  Trypan blue was irrigated into the anterior chamber to enhance capsular visualization in the setting of poor red reflex.  This was followed by injection of  viscoat. A 2.6 mm keratome was used to make a temporal, triplanar clear corneal incision.   Capsulorrhexis was completed with a bent cystitome and Utrata forceps. Hydrodissection of the nucleus was achieved with the Maravilla cannula. The nucleus was found to move freely within the capsular bag. The cataract was successfully removed.    The irrigation and aspiration handpiece was used to remove the cortex. The capsular bag was  filled with Healon. The intraocular lens was injected into the capsular bag. The remaining viscoelastic was removed using irrigation and aspiration. The lens was found to be well-centered and in the capsular bag. BSS on a cannula was used to hydrate the clear corneal and paracentesis incisions. Weck-topher sponges were used to confirm there were no leaks from the incisions.    A shield was taped over the eye. The patient was then taken to the recovery room in stable condition having tolerated the procedure well and discharged home in good condition.    Dr. Reginald Cabezas was scrubbed and present for the entire case.    Reginald Cabezas MD  5/3/2017

## 2017-05-03 NOTE — ANESTHESIA POSTPROCEDURE EVALUATION
Patient: Paulino Gomez    Procedure(s):  Left  Eye Cataract Extraction with Intraocular lens Implant - Wound Class: I-Clean    Diagnosis:Cataract   Diagnosis Additional Information: No value filed.    Anesthesia Type:  MAC    Note:  Anesthesia Post Evaluation    Patient location during evaluation: Phase 2  Patient participation: Able to fully participate in evaluation  Level of consciousness: awake  Pain management: adequate  Airway patency: patent  Cardiovascular status: acceptable  Respiratory status: acceptable  Hydration status: acceptable  PONV: none     Anesthetic complications: None          Last vitals:  Vitals:    05/03/17 0644 05/03/17 0915 05/03/17 0930   BP: (!) 127/91 (!) 142/96 132/90   Pulse: 99     Resp: 18 16    Temp:  36.6  C (97.8  F)    SpO2: 95% 97%          Electronically Signed By: Adalberto Hoskins MD  May 3, 2017  10:30 AM

## 2017-05-03 NOTE — ANESTHESIA PREPROCEDURE EVALUATION
Anesthesia Evaluation     . Pt has had prior anesthetic. Type: General (ponv)           ROS/MED HX    ENT/Pulmonary:  - neg pulmonary ROS     Neurologic:  - neg neurologic ROS     Cardiovascular:     (+) Dyslipidemia, ----. : . . . :. .       METS/Exercise Tolerance:  >4 METS   Hematologic:  - neg hematologic  ROS       Musculoskeletal:  - neg musculoskeletal ROS       GI/Hepatic:  - neg GI/hepatic ROS       Renal/Genitourinary:  - ROS Renal section negative       Endo:  - neg endo ROS       Psychiatric:  - neg psychiatric ROS       Infectious Disease:  - neg infectious disease ROS       Malignancy:      - no malignancy   Other:    - neg other ROS                 Physical Exam  Normal systems: dental    Airway   Mallampati: I  TM distance: >3 FB  Neck ROM: full    Dental     Cardiovascular   Rhythm and rate: regular      Pulmonary    breath sounds clear to auscultation                    Anesthesia Plan      History & Physical Review  History and physical reviewed and following examination; no interval change.    ASA Status:  2 .    NPO Status:  > 8 hours    Plan for MAC with Intravenous induction.          Postoperative Care  Postoperative pain management:  Multi-modal analgesia.      Consents                          .

## 2017-05-03 NOTE — IP AVS SNAPSHOT
[facility]    [address] [place] [address]    Phone:  [phone]    Fax:  [phone]                                       After Visit Summary   5/3/2017    Paulino Gomez    MRN: [id number]           After Visit Summary Signature Page     I have received my discharge instructions, and my questions have been answered. I have discussed any challenges I see with this plan with the nurse or doctor.    ..........................................................................................................................................  Patient/Patient Representative Signature      ..........................................................................................................................................  Patient Representative Print Name and Relationship to Patient    ..................................................               ................................................  Date                                            Time    ..........................................................................................................................................  Reviewed by Signature/Title    ...................................................              ..............................................  Date                                                            Time

## 2017-05-03 NOTE — IP AVS SNAPSHOT
MRN:[id number]                      After Visit Summary   5/3/2017    Paulino Gomez    MRN: [id number]           Thank you!     Thank you for choosing [facility] for your care. Our goal is always to provide you with excellent care. Hearing back from our patients is one way we can continue to improve our services. Please take a few minutes to complete the written survey that you may receive in the mail after you visit with us. Thank you!        Patient Information     Date Of Birth          1965        About your hospital stay     You were admitted on:  May 3, 2017 You last received care in the:  [facility]    You were discharged on:  May 3, 2017       Who to Call     For medical emergencies, please call 911.  For non-urgent questions about your medical care, please call your primary care provider or clinic, [phone]  For questions related to your surgery, please call your surgery clinic        Attending Provider     Provider Specialty    Reginald Cabezas MD Ophthalmology       Primary Care Provider Office Phone # Fax #    Reese Moser [phone]       [facility] [address] [address]        Your next 10 appointments already scheduled     May 03, 2017   Procedure with Reginald Cabezas MD   [facility] ([facility])    [address] [place] [address]  [address] [phone]           Located in the Clinics and Surgery Center at [address] [place] [address].   parking is very convenient and highly recommended.  is a $6 flat rate fee.  Both  and self parkers should enter the main arrival plaza from [facility]; parking attendants will direct you based on your parking preference.            May 04, 2017  2:00 PM CDT   Post-Op with Reginald Cabezas MD   Eye Clinic ([facility])    [address]  [address]  [facility]  [address] Clin [address]  [facility] [address]   [phone]            May 11, 2017  2:00 PM CDT   Post-Op with Reginald Cabezas MD   Eye Clinic ([facility])    [address]  [address] [facility]  9th Fl Clin 9a  [facility] [address]   [phone]              Further instructions from your care team       [facility]     Home Care Following Cataract Surgery    If you have a gauze eye patch on, please do not remove it until it is removed by your doctor at your first appointment after your surgery.  You will start your eye drops the next day.    OR    If you only have a clear eye shield on, you may remove the eye shield when you get home and begin eye drops today as directed by your doctor.      Wear the clear eye shield for protection when sleeping for the next 5 days.      Do not rub the eye that had the operation.      Your eye might be sensitive to light.  Wearing sunglasses may be more comfortable for you.      You may have some discomfort and irritation.  Acetaminophen (Tylenol) or Ibuprofen (Advil) may be taken for discomfort. If pain persists please call your doctor s office.      Keep the eye that had the surgery dry. You may wash your hair, bathe or shower, but keep your eye closed while doing so.       Avoid bending over, strenuous activity or heavy lifting until this activity has been approved by your doctor.      You have a follow-up appointment with your doctor tomorrow at the [facility] ([phone]).  Bring all your prescribed eye drops with you to this follow-up appointment.        If you take glaucoma medications, bring them with you to your follow-up appointment tomorrow.      Use medication exactly as prescribed by your doctor. You may restart your regular home medications.       Call your doctor s office at [phone] if any of the following should occur:    - Any sudden vision changes, including  decreased vision  - Nausea or severe headache  - Increase in pain that is not controlled with Acetaminophen (Tylenol) or Ibuprofen (Advil)  - Signs of infection (pus, increasing redness or tenderness)  - Severe sensitivity to light  - An increase in floaters (black spots in front of your vision)    [facility]  Home Care Following Anesthesia  For 24 hours after surgery:  1. Get plenty of rest.  A responsible adult must stay with you for at least 24 hours after you leave the surgery center.  2. Do not drive or use heavy equipment.  If you have weakness or tingling, don't drive or use heavy equipment until this feeling goes away.   3. Do not drink alcohol.   4. Avoid strenuous or risky activities.  Ask for help when climbing stairs.  5. You may feel lightheaded.  IF so, sit for a few minutes before standing.  Have someone help you get up.   6. If you have nausea (feel sick to your stomach): Drink only clear liquids such as apple juice, ginger ale, broth or 7-Up.  Rest may also help.  Be sure to drink enough fluids.  Move to a regular diet as you feel able.   7. You may have a slight fever.  Call the doctor if your fever is over 100 F (37.7 C) (taken under the tongue) or lasts longer than 24 hours.  8. You may have a dry mouth, a sore throat, muscle aches or trouble sleeping. These should go away after 24 hours.  9. Do not make important or legal decisions.               Tips for taking pain medications  To get the best pain relief possible, remember these points:    Take pain medications as directed, before pain becomes severe.    Pain medication can upset your stomach: taking it with food may help.    Constipation is a common side effect of pain medication. Drink plenty of  fluids.    Eat foods high in fiber. Take a stool softener if recommended by your doctor or pharmacist.    Do not drink alcohol, drive or operate machinery while taking pain medications.    Ask about other ways  "to control pain, such as with heat, ice or relaxation.    Call a doctor for any of the followin. Signs of infection (fever, growing tenderness at the surgery site, a large amount of drainage or bleeding, severe pain, foul-smelling drainage, redness, swelling).  2. It has been over 8 to 10 hours since surgery and you are still not able to urinate (pass water).  3. Headache for over 24 hours.  4. Numbness, tingling or weakness the day after surgery (if you had spinal anesthesia).  Your doctor is:       Dr. Reginald Cabezas, Ophthalmology: [phone]               Or dial [phone] and ask for the resident on call for:  Ophthalmology  For emergency care, call the:  [facility] [facility]:  [phone] (TTY for hearing impaired: [phone])      Pending Results     No orders found from 2017 to 2017.            Admission Information     Date & Time Provider Department Dept. Phone    5/3/2017 Reginald Cabezas MD [facility] [phone]      Your Vitals Were     Blood Pressure Pulse Respirations Height Weight Pulse Oximetry    127/91 99 18 1.778 m (5' 10\") 95.3 kg (210 lb) 95%    BMI (Body Mass Index)                   30.13 kg/m2           DailyCredharPWRF Information     [email or web address] is an electronic gateway that provides easy, online access to your medical records. With [email or web address], you can request a clinic appointment, read your test results, renew a prescription or communicate with your care team.     To sign up for [email or web address] visit the website at [email or web address]   You will be asked to enter the access code listed below, as well as some personal information. Please follow the directions to create your username and password.     Your access code is: [email or web address]  Expires: 2017  8:30 AM     Your access code will  in 90 days. If you need help or a new code, please contact your [facility] or call [phone] for assistance.      "   Care EveryWhere ID     This is your Care EveryWhere ID. This could be used by other organizations to access your [place] medical records  [id number]           Review of your medicines      UNREVIEWED medicines. Ask your doctor about these medicines        Dose / Directions    aspirin 81 MG tablet        Dose:  1 tablet   Take 1 tablet by mouth daily.   Refills:  0       carboxymethylcellul-glycerin 0.5-0.9 % Soln ophthalmic solution   Commonly known as:  OPTIVE/REFRESH OPTIVE        Dose:  1 drop   1 drop as needed   Refills:  0       FISH OIL PO        Dose:  1 tablet   Take 1 tablet by mouth daily   Refills:  0       MULTIVITAMIN PO        Dose:  1 tablet   Take 1 tablet by mouth daily   Refills:  0       * prednisoLONE acetate 1 % ophthalmic susp   Commonly known as:  PRED FORTE        Dose:  1 drop   Place 1 drop Into the left eye daily   Refills:  0       * prednisoLONE acetate 1 % ophthalmic susp   Commonly known as:  PRED FORTE   Used for:  History of penetrating keratoplasty        Dose:  1 drop   Place 1 drop Into the left eye daily   Quantity:  5 mL   Refills:  11       simvastatin 40 MG tablet   Commonly known as:  ZOCOR        Refills:  4       VITAMIN C PO        Dose:  1 tablet   Take 1 tablet by mouth daily   Refills:  0       * Notice:  This list has 2 medication(s) that are the same as other medications prescribed for you. Read the directions carefully, and ask your doctor or other care provider to review them with you.      START taking        Dose / Directions    ketorolac 0.5 % ophthalmic solution   Commonly known as:  ACULAR   Used for:  Posterior subcapsular age-related cataract, left eye        Dose:  1 drop   Place 1 drop Into the left eye 4 times daily   Quantity:  5 mL   Refills:  0       ofloxacin 0.3 % ophthalmic solution   Commonly known as:  OCUFLOX   Used for:  Posterior subcapsular age-related cataract, left eye        Dose:  1 drop   Place 1 drop Into the left eye 4 times daily    Quantity:  1 Bottle   Refills:  0            Where to get your medicines      These medications were sent to [place], [place] - [address] [facility] [address]  [address] [facility] [address], [facility] [address]    Hours:  TRANSPLANT PHONE NUMBER [phone] Phone:  [phone]     ketorolac 0.5 % ophthalmic solution    ofloxacin 0.3 % ophthalmic solution                Protect others around you: Learn how to safely use, store and throw away your medicines at www.disposemymeds.org.             Medication List: This is a list of all your medications and when to take them. Check marks below indicate your daily home schedule. Keep this list as a reference.      Medications           Morning Afternoon Evening Bedtime As Needed    aspirin 81 MG tablet   Take 1 tablet by mouth daily.                                carboxymethylcellul-glycerin 0.5-0.9 % Soln ophthalmic solution   Commonly known as:  OPTIVE/REFRESH OPTIVE   1 drop as needed                                FISH OIL PO   Take 1 tablet by mouth daily                                ketorolac 0.5 % ophthalmic solution   Commonly known as:  ACULAR   Place 1 drop Into the left eye 4 times daily                                MULTIVITAMIN PO   Take 1 tablet by mouth daily                                ofloxacin 0.3 % ophthalmic solution   Commonly known as:  OCUFLOX   Place 1 drop Into the left eye 4 times daily                                * prednisoLONE acetate 1 % ophthalmic susp   Commonly known as:  PRED FORTE   Place 1 drop Into the left eye daily                                * prednisoLONE acetate 1 % ophthalmic susp   Commonly known as:  PRED FORTE   Place 1 drop Into the left eye daily                                simvastatin 40 MG tablet   Commonly known as:  ZOCOR                                VITAMIN C PO   Take 1 tablet by mouth daily                                * Notice:  This list has 2 medication(s) that  are the same as other medications prescribed for you. Read the directions carefully, and ask your doctor or other care provider to review them with you.

## 2017-05-03 NOTE — DISCHARGE INSTRUCTIONS
[facility]     Home Care Following Cataract Surgery    If you have a gauze eye patch on, please do not remove it until it is removed by your doctor at your first appointment after your surgery.  You will start your eye drops the next day.    OR    If you only have a clear eye shield on, you may remove the eye shield when you get home and begin eye drops today as directed by your doctor.      Wear the clear eye shield for protection when sleeping for the next 5 days.      Do not rub the eye that had the operation.      Your eye might be sensitive to light.  Wearing sunglasses may be more comfortable for you.      You may have some discomfort and irritation.  Acetaminophen (Tylenol) or Ibuprofen (Advil) may be taken for discomfort. If pain persists please call your doctor s office.      Keep the eye that had the surgery dry. You may wash your hair, bathe or shower, but keep your eye closed while doing so.       Avoid bending over, strenuous activity or heavy lifting until this activity has been approved by your doctor.      You have a follow-up appointment with your doctor tomorrow at the [facility] ([phone]).  Bring all your prescribed eye drops with you to this follow-up appointment.        If you take glaucoma medications, bring them with you to your follow-up appointment tomorrow.      Use medication exactly as prescribed by your doctor. You may restart your regular home medications.       Call your doctor s office at [phone] if any of the following should occur:    - Any sudden vision changes, including decreased vision  - Nausea or severe headache  - Increase in pain that is not controlled with Acetaminophen (Tylenol) or Ibuprofen (Advil)  - Signs of infection (pus, increasing redness or tenderness)  - Severe sensitivity to light  - An increase in floaters (black spots in front of your vision)    [facility]  Home Care Following Anesthesia  For 24 hours after surgery:  1. Get plenty of rest.  A responsible adult must stay with you for at least 24 hours after you leave the surgery center.  2. Do not drive or use heavy equipment.  If you have weakness or tingling, don't drive or use heavy equipment until this feeling goes away.   3. Do not drink alcohol.   4. Avoid strenuous or risky activities.  Ask for help when climbing stairs.  5. You may feel lightheaded.  IF so, sit for a few minutes before standing.  Have someone help you get up.   6. If you have nausea (feel sick to your stomach): Drink only clear liquids such as apple juice, ginger ale, broth or 7-Up.  Rest may also help.  Be sure to drink enough fluids.  Move to a regular diet as you feel able.   7. You may have a slight fever.  Call the doctor if your fever is over 100 F (37.7 C) (taken under the tongue) or lasts longer than 24 hours.  8. You may have a dry mouth, a sore throat, muscle aches or trouble sleeping. These should go away after 24 hours.  9. Do not make important or legal decisions.               Tips for taking pain medications  To get the best pain relief possible, remember these points:    Take pain medications as directed, before pain becomes severe.    Pain medication can upset your stomach: taking it with food may help.    Constipation is a common side effect of pain medication. Drink plenty of  fluids.    Eat foods high in fiber. Take a stool softener if recommended by your doctor or pharmacist.    Do not drink alcohol, drive or operate machinery while taking pain medications.    Ask about other ways to control pain, such as with heat, ice or relaxation.    Call a doctor for any of the followin. Signs of infection (fever, growing tenderness at the surgery site, a large amount of drainage or bleeding, severe pain, foul-smelling drainage, redness, swelling).  2. It has been over 8 to 10 hours since surgery and you are still not able  to urinate (pass water).  3. Headache for over 24 hours.  4. Numbness, tingling or weakness the day after surgery (if you had spinal anesthesia).  Your doctor is:       Dr. Reginald Cabezas, Ophthalmology: [phone]               Or dial [phone] and ask for the resident on call for:  Ophthalmology  For emergency care, call the:  [place] [facility]:  [phone] (TTY for hearing impaired: [phone])

## 2017-05-03 NOTE — ANESTHESIA CARE TRANSFER NOTE
Patient: Paulino Gomez    Procedure(s):  Left  Eye Cataract Extraction with Intraocular lens Implant - Wound Class: I-Clean    Diagnosis: Cataract   Diagnosis Additional Information: No value filed.    Anesthesia Type:   MAC     Note:  Airway :Room Air  Patient transferred to:Phase II  Comments: Arrive Phase II, Stable, Airway Intact  142/96, 56,16,98,97.8  All questions answered.      Vitals: (Last set prior to Anesthesia Care Transfer)    CRNA VITALS  5/3/2017 0835 - 5/3/2017 0909      5/3/2017             EKG: Sinus bradycardia                Electronically Signed By: DEO Alejandra CRNA  May 3, 2017  9:09 AM

## 2017-05-04 ENCOUNTER — OFFICE VISIT (OUTPATIENT)
Dept: OPHTHALMOLOGY | Facility: CLINIC | Age: 52
End: 2017-05-04
Attending: OPHTHALMOLOGY
Payer: COMMERCIAL

## 2017-05-04 DIAGNOSIS — Z94.7 S/P PKP (PENETRATING KERATOPLASTY): Primary | ICD-10-CM

## 2017-05-04 DIAGNOSIS — Z96.1 PSEUDOPHAKIA: ICD-10-CM

## 2017-05-04 DIAGNOSIS — H18.603 KERATOCONUS, BILATERAL: ICD-10-CM

## 2017-05-04 PROCEDURE — 99212 OFFICE O/P EST SF 10 MIN: CPT | Mod: ZF

## 2017-05-04 RX ORDER — PREDNISOLONE ACETATE 10 MG/ML
1 SUSPENSION/ DROPS OPHTHALMIC 4 TIMES DAILY
COMMUNITY
End: 2017-10-03

## 2017-05-04 ASSESSMENT — VISUAL ACUITY
METHOD: SNELLEN - LINEAR
OD_CC: 20/30
OS_SC: 20/400
OS_PH_SC: 20/70-2

## 2017-05-04 ASSESSMENT — TONOMETRY
IOP_METHOD: 28
OD_IOP_MMHG: 17
OS_IOP_MMHG: 25
OS_IOP_MMHG: 26
IOP_METHOD: TONOPEN
IOP_METHOD: TONOPEN

## 2017-05-04 ASSESSMENT — REFRACTION_WEARINGRX
OD_CYLINDER: +3.75
OD_SPHERE: -9.00
OS_ADD: +2.00
SPECS_TYPE: PAL
OS_SPHERE: -10.25
OS_AXIS: 176
OS_CYLINDER: +5.50
OD_ADD: +2.00
OD_AXIS: 170

## 2017-05-04 ASSESSMENT — SLIT LAMP EXAM - LIDS
COMMENTS: NORMAL
COMMENTS: NORMAL

## 2017-05-04 ASSESSMENT — EXTERNAL EXAM - RIGHT EYE: OD_EXAM: NORMAL

## 2017-05-04 ASSESSMENT — EXTERNAL EXAM - LEFT EYE: OS_EXAM: NORMAL

## 2017-05-04 NOTE — NURSING NOTE
Chief Complaints and History of Present Illnesses   Patient presents with     Post Op (Ophthalmology) Left Eye     HPI    Symptoms:           Do you have eye pain now?:  No      Comments:  One day POP left eye CE/IOL.  The patient has started the three bottles of drops yesterday.    FELTON Christianson 1:53 PM 05/04/2017

## 2017-05-04 NOTE — MR AVS SNAPSHOT
After Visit Summary   2017    Paulino Gomez    MRN: [id number]           Patient Information     Date Of Birth          1965        Visit Information        Provider Department      2017 2:00 PM Reginald Cabezas MD Eye Clinic        Today's Diagnoses     S/P PKP (penetrating keratoplasty)    -  1    Keratoconus, bilateral        Pseudophakia           Follow-ups after your visit        Your next 10 appointments already scheduled     May 11, 2017  2:00 PM CDT   Post-Op with Reginald Cabezas MD   Eye Clinic ([facility])    [address]  [address] [facility]  9th Fl Clin 9a  [facility] [address]   [phone]              Who to contact     Please call your clinic at [phone] to:    Ask questions about your health    Make or cancel appointments    Discuss your medicines    Learn about your test results    Speak to your doctor   If you have compliments or concerns about an experience at your clinic, or if you wish to file a complaint, please contact [facility] Patient Relations at [phone] or email us at [email or web address]         Additional Information About Your Visit        MyChart Information     [email or web address] is an electronic gateway that provides easy, online access to your medical records. With MPOWER Mobile, you can request a clinic appointment, read your test results, renew a prescription or communicate with your care team.     To sign up for [email or web address] visit the website at [email or web address]   You will be asked to enter the access code listed below, as well as some personal information. Please follow the directions to create your username and password.     Your access code is: [email or web address]  Expires: 2017  8:30 AM     Your access code will  in 90 days. If you need help or a new code, please contact your [facility] or call [phone] for assistance.        Care EveryWhere  ID     This is your Care EveryWhere ID. This could be used by other organizations to access your [place] medical records  [id number]         Blood Pressure from Last 3 Encounters:   05/03/17 132/90   02/13/14 113/72   01/30/13 128/82    Weight from Last 3 Encounters:   05/03/17 95.3 kg (210 lb)   02/13/14 93 kg (205 lb)   01/30/13 99 kg (218 lb 3.2 oz)              Today, you had the following     No orders found for display         Today's Medication Changes          These changes are accurate as of: 5/4/17 11:59 PM.  If you have any questions, ask your nurse or doctor.               These medicines have changed or have updated prescriptions.        Dose/Directions    prednisoLONE acetate 1 % ophthalmic susp   Commonly known as:  PRED FORTE   This may have changed:  Another medication with the same name was removed. Continue taking this medication, and follow the directions you see here.   Used for:  History of penetrating keratoplasty   Changed by:  Reginald Cabezas MD        Dose:  1 drop   Place 1 drop Into the left eye daily   Quantity:  5 mL   Refills:  11                Primary Care Provider Office Phone # Fax #    Reese Moser [phone]       [facility] [address] [facility] [address]        Thank you!     Thank you for choosing EYE CLINIC  for your care. Our goal is always to provide you with excellent care. Hearing back from our patients is one way we can continue to improve our services. Please take a few minutes to complete the written survey that you may receive in the mail after your visit with us. Thank you!             Your Updated Medication List - Protect others around you: Learn how to safely use, store and throw away your medicines at www.disposemymeds.org.          This list is accurate as of: 5/4/17 11:59 PM.  Always use your most recent med list.                   Brand Name Dispense Instructions for use    aspirin 81 MG tablet      Take 1  tablet by mouth daily.       carboxymethylcellul-glycerin 0.5-0.9 % Soln ophthalmic solution    OPTIVE/REFRESH OPTIVE     1 drop as needed       FISH OIL PO      Take 1 tablet by mouth daily       ketorolac 0.5 % ophthalmic solution    ACULAR    5 mL    Place 1 drop Into the left eye 4 times daily       MULTIVITAMIN PO      Take 1 tablet by mouth daily       ofloxacin 0.3 % ophthalmic solution    OCUFLOX    1 Bottle    Place 1 drop Into the left eye 4 times daily       prednisoLONE acetate 1 % ophthalmic susp    PRED FORTE     Place 1 drop Into the left eye 4 times daily       prednisoLONE acetate 1 % ophthalmic susp    PRED FORTE    5 mL    Place 1 drop Into the left eye daily       simvastatin 40 MG tablet    ZOCOR         VITAMIN C PO      Take 1 tablet by mouth daily

## 2017-05-04 NOTE — PROGRESS NOTES
Assessment / Plan:    Paulino Gomez is a 52 year old male who is 1 day s/p cataract extraction with intraocular lens placement OS.     Off to a good start.  IOP left eye today mid to high 20s - observe for now    Medications in the surgical eye:    prednisolone acetate 1% four times a day     Ketorolac 0.4% four times a day     Ofloxacin four times a day      Limit heavy lifting, bending over, and heavy exertion. Light aerobic activity is acceptable. Avoid swimming pools, hot tubs, or saunas for 3-4 weeks.   Wear the protective shield at night for the next seven days, and call for increased redness, discharge, pain, or decreased vision.    Return to clinic in approximately 1 week, earlier as needed.    Tigre Holbrook MD  Ophthalmology resident, PGY-3      ~~~~~~~~~~~~~~~~~~~~~~~~~~~~~~~~~~~~~~~~~~~~~~~~~~~~~~~~~~~~~~~~    Complete documentation of historical and exam elements from today's encounter can be found in the full encounter summary report (not reduplicated in this progress note). I personally obtained the chief complaint(s) and history of present illness.  I confirmed and edited as necessary the review of systems, past medical/surgical history, family history, social history, and examination findings as documented by others.  I examined the patient myself, and I personally reviewed the relevant tests, images, and reports as documented above. I formulated and edited as necessary the assessment and plan and discussed the findings and management plan with the patient and family.     Reginald Cabezas MD, MA  Director, Cornea & Anterior Segment  [facility] Department of Ophthalmology & Visual Neuroscience

## 2017-05-11 ENCOUNTER — HOSPITAL ENCOUNTER (EMERGENCY)
Facility: CLINIC | Age: 52
Discharge: HOME | End: 2017-05-11
Attending: EMERGENCY MEDICINE | Admitting: EMERGENCY MEDICINE
Payer: COMMERCIAL

## 2017-05-11 ENCOUNTER — OFFICE VISIT (OUTPATIENT)
Dept: OPHTHALMOLOGY | Facility: CLINIC | Age: 52
End: 2017-05-11
Attending: OPHTHALMOLOGY
Payer: COMMERCIAL

## 2017-05-11 ENCOUNTER — APPOINTMENT (OUTPATIENT)
Dept: CT IMAGING | Facility: CLINIC | Age: 52
End: 2017-05-11
Attending: EMERGENCY MEDICINE
Payer: COMMERCIAL

## 2017-05-11 VITALS
OXYGEN SATURATION: 98 % | RESPIRATION RATE: 16 BRPM | BODY MASS INDEX: 30.06 KG/M2 | TEMPERATURE: 98.1 F | HEIGHT: 70 IN | HEART RATE: 61 BPM | DIASTOLIC BLOOD PRESSURE: 81 MMHG | SYSTOLIC BLOOD PRESSURE: 124 MMHG | WEIGHT: 210 LBS

## 2017-05-11 DIAGNOSIS — N20.1 URETEROLITHIASIS: ICD-10-CM

## 2017-05-11 DIAGNOSIS — Z96.1 PSEUDOPHAKIA: Primary | ICD-10-CM

## 2017-05-11 DIAGNOSIS — Z94.7 S/P PKP (PENETRATING KERATOPLASTY): ICD-10-CM

## 2017-05-11 LAB
ALBUMIN UR-MCNC: 30 MG/DL
APPEARANCE UR: CLEAR
BILIRUB UR QL STRIP: NEGATIVE
COLOR UR AUTO: YELLOW
GLUCOSE UR STRIP-MCNC: NEGATIVE MG/DL
HGB UR QL STRIP: ABNORMAL
KETONES UR STRIP-MCNC: NEGATIVE MG/DL
LEUKOCYTE ESTERASE UR QL STRIP: ABNORMAL
MUCOUS THREADS #/AREA URNS LPF: PRESENT /LPF
NITRATE UR QL: NEGATIVE
PH UR STRIP: 6 PH (ref 5–7)
RBC #/AREA URNS AUTO: >182 /HPF (ref 0–2)
SP GR UR STRIP: 1.02 (ref 1–1.03)
URN SPEC COLLECT METH UR: ABNORMAL
UROBILINOGEN UR STRIP-MCNC: NORMAL MG/DL (ref 0–2)
WBC #/AREA URNS AUTO: 3 /HPF (ref 0–2)

## 2017-05-11 PROCEDURE — 74176 CT ABD & PELVIS W/O CONTRAST: CPT

## 2017-05-11 PROCEDURE — 99212 OFFICE O/P EST SF 10 MIN: CPT | Mod: ZF

## 2017-05-11 PROCEDURE — 99285 EMERGENCY DEPT VISIT HI MDM: CPT | Mod: 25

## 2017-05-11 PROCEDURE — 25000128 H RX IP 250 OP 636: Performed by: EMERGENCY MEDICINE

## 2017-05-11 PROCEDURE — 96361 HYDRATE IV INFUSION ADD-ON: CPT

## 2017-05-11 PROCEDURE — 81001 URINALYSIS AUTO W/SCOPE: CPT | Performed by: EMERGENCY MEDICINE

## 2017-05-11 PROCEDURE — 96374 THER/PROPH/DIAG INJ IV PUSH: CPT

## 2017-05-11 RX ORDER — OXYCODONE AND ACETAMINOPHEN 5; 325 MG/1; MG/1
1-2 TABLET ORAL EVERY 4 HOURS PRN
Qty: 20 TABLET | Refills: 0 | Status: SHIPPED | OUTPATIENT
Start: 2017-05-11 | End: 2020-11-18

## 2017-05-11 RX ORDER — KETOROLAC TROMETHAMINE 30 MG/ML
30 INJECTION, SOLUTION INTRAMUSCULAR; INTRAVENOUS ONCE
Status: COMPLETED | OUTPATIENT
Start: 2017-05-11 | End: 2017-05-11

## 2017-05-11 RX ORDER — TAMSULOSIN HYDROCHLORIDE 0.4 MG/1
0.4 CAPSULE ORAL DAILY
Qty: 10 CAPSULE | Refills: 0 | Status: SHIPPED | OUTPATIENT
Start: 2017-05-11 | End: 2017-05-21

## 2017-05-11 RX ORDER — SODIUM CHLORIDE 9 MG/ML
1000 INJECTION, SOLUTION INTRAVENOUS CONTINUOUS
Status: DISCONTINUED | OUTPATIENT
Start: 2017-05-11 | End: 2017-05-11 | Stop reason: HOSPADM

## 2017-05-11 RX ORDER — OXYCODONE AND ACETAMINOPHEN 5; 325 MG/1; MG/1
325 TABLET ORAL EVERY 4 HOURS PRN
COMMUNITY
End: 2017-10-03

## 2017-05-11 RX ADMIN — SODIUM CHLORIDE 1000 ML: 9 INJECTION, SOLUTION INTRAVENOUS at 08:54

## 2017-05-11 RX ADMIN — KETOROLAC TROMETHAMINE 30 MG: 30 INJECTION, SOLUTION INTRAMUSCULAR at 08:54

## 2017-05-11 ASSESSMENT — ENCOUNTER SYMPTOMS
FEVER: 0
NAUSEA: 1
VOMITING: 0
FLANK PAIN: 1
DIAPHORESIS: 1

## 2017-05-11 ASSESSMENT — SLIT LAMP EXAM - LIDS
COMMENTS: NORMAL
COMMENTS: NORMAL

## 2017-05-11 ASSESSMENT — VISUAL ACUITY
OS_SC: 20/200
OS_PH_SC: 20/50-2
METHOD: SNELLEN - LINEAR
OD_CC: 20/20
OD_CC+: -2

## 2017-05-11 ASSESSMENT — TONOMETRY
OS_IOP_MMHG: 14
OD_IOP_MMHG: 15
IOP_METHOD: ICARE

## 2017-05-11 ASSESSMENT — EXTERNAL EXAM - LEFT EYE: OS_EXAM: NORMAL

## 2017-05-11 ASSESSMENT — EXTERNAL EXAM - RIGHT EYE: OD_EXAM: NORMAL

## 2017-05-11 NOTE — NURSING NOTE
Chief Complaints and History of Present Illnesses   Patient presents with     Post Op (Ophthalmology) Left Eye     IOL POW1     HPI    Affected eye(s):  Left   Symptoms:     Blurred vision (Comment: LE but still blurred with waviness)   Floaters (Comment: LE [reading black floaters] started 3-4 days ago)   No glare   No halos   No foreign body sensation   No tearing   No itching   Photophobia (Comment: Always has had)      Duration:  1 week      Do you have eye pain now?:  No      Comments:  Was in emergency this morning as he was diagnosed with Kidney stones; on new medication for 3-4 days.  Doing well over all since Sx on 5/3/17.  Still using drops as prescribed.    Amarilis YA 2:22 PM May 11, 2017

## 2017-05-11 NOTE — MR AVS SNAPSHOT
After Visit Summary   5/11/2017    Paulino Gomez    MRN: [id number]           Patient Information     Date Of Birth          1965        Visit Information        Provider Department      5/11/2017 2:00 PM Reginald Cabe[facility]as MD [facility]        Today's Diagnoses     Pseudophakia    -  1    S/P PKP (penetrating keratoplasty)          Care Instructions    prednisolone acetate 1% (white / pink cap): begin taper: three times a day for one week, twice a day for one week, daily (stay on daily due to transplant)     Ketorolac 0.4% (grey cap): three times a day for one week, twice a day for one week, daily for one week then stop     Ofloxacin (tan cap): STOP        Follow-ups after your visit        Follow-up notes from your care team     Return in about 1 month (around 6/11/2017) for MRx.      Your next 10 appointments already scheduled     Jun 06, 2017  7:45 AM CDT   Post-Op with Reginald Cabezas MD   Eye Clinic ([facility])    [facility]  [address] [place]  [address]  [facility] [id number] [phone]              Who to contact     Please call your clinic at [phone] to:    Ask questions about your health    Make or cancel appointments    Discuss your medicines    Learn about your test results    Speak to your doctor   If you have compliments or concerns about an experience at your clinic, or if you wish to file a complaint, please contact [facility] Patient Relations at [phone] or email us at [email or web address]         Additional Information About Your Visit        MyChart Information     INFIMET is an electronic gateway that provides easy, online access to your medical records. With INFIMET, you can request a clinic appointment, read your test results, renew a prescription or communicate with your care team.     To sign up for [employer] visit the website at [email or web address]   You will be asked to enter  the access code listed below, as well as some personal information. Please follow the directions to create your username and password.     Your access code is: [email or web address]  Expires: 2017  8:30 AM     Your access code will  in 90 days. If you need help or a new code, please contact your [facility] or call [phone] for assistance.        Care EveryWhere ID     This is your Care EveryWhere ID. This could be used by other organizations to access your [facility] medical records  [id number]         Blood Pressure from Last 3 Encounters:   17 124/81   17 132/90   14 113/72    Weight from Last 3 Encounters:   17 95.3 kg (210 lb)   17 95.3 kg (210 lb)   14 93 kg (205 lb)              Today, you had the following     No orders found for display       Primary Care Provider Office Phone # Fax #    Reese Moser [phone]       [facility] [address] [facility] [address]        Thank you!     Thank you for choosing EYE CLINIC  for your care. Our goal is always to provide you with excellent care. Hearing back from our patients is one way we can continue to improve our services. Please take a few minutes to complete the written survey that you may receive in the mail after your visit with us. Thank you!             Your Updated Medication List - Protect others around you: Learn how to safely use, store and throw away your medicines at www.disposemymeds.org.          This list is accurate as of: 17 11:59 PM.  Always use your most recent med list.                   Brand Name Dispense Instructions for use    aspirin 81 MG tablet      Take 1 tablet by mouth daily.       carboxymethylcellul-glycerin 0.5-0.9 % Soln ophthalmic solution    OPTIVE/REFRESH OPTIVE     1 drop as needed       FISH OIL PO      Take 1 tablet by mouth daily       ketorolac 0.5 % ophthalmic solution    ACULAR    5 mL    Place 1 drop  Into the left eye 4 times daily       MULTIVITAMIN PO      Take 1 tablet by mouth daily       ofloxacin 0.3 % ophthalmic solution    OCUFLOX    1 Bottle    Place 1 drop Into the left eye 4 times daily       * oxyCODONE-acetaminophen 5-325 MG per tablet    PERCOCET     Take 325 tablets by mouth every 4 hours as needed       * oxyCODONE-acetaminophen 5-325 MG per tablet    PERCOCET    20 tablet    Take 1-2 tablets by mouth every 4 hours as needed for moderate to severe pain       prednisoLONE acetate 1 % ophthalmic susp    PRED FORTE     Place 1 drop Into the left eye 4 times daily       prednisoLONE acetate 1 % ophthalmic susp    PRED FORTE    5 mL    Place 1 drop Into the left eye daily       simvastatin 40 MG tablet    ZOCOR         * tamsulosin 80 mcg/mL Susp    FLOMAX     Take 400 mcg by mouth daily       * tamsulosin 0.4 MG capsule    FLOMAX    10 capsule    Take 1 capsule (0.4 mg) by mouth daily for 10 doses       VITAMIN C PO      Take 1 tablet by mouth daily       * Notice:  This list has 4 medication(s) that are the same as other medications prescribed for you. Read the directions carefully, and ask your doctor or other care provider to review them with you.

## 2017-05-11 NOTE — ED NOTES
Bed: ED20  Expected date:   Expected time:   Means of arrival:   Comments:  [place] - 52M flank pain eta 3837

## 2017-05-11 NOTE — ED PROVIDER NOTES
History     Chief Complaint:  Flank Pain    HPI   Paulino Gomez is a 52 year old male who presents with concern for sudden onset right flank pain radiating around to the RLQ. The patient reports that he awoke this morning feeling well and went on a walk with no problem. However, when he got home, he had abrupt onset of pain in his right low back/flank. He initially thought that the pain was from his back as he has had low back problems intermittently throughout his whole life, but the pain quickly worsened and started radiating around to the abdomen. The pain does not improve or worsen with anything specific and he feels as though he cannot find a comfortable position. The pain is associated with waves of diaphoresis and nausea but no vomiting or fevers. The patient's pain became so severe at home that he called 9-1-1 for EMS transport instead of driving to the ED. En route, the patient received 1mg of Dilaudid which brought his pain from severe to mild. The patient is unsure about any urinary symptoms as he has not urinated since the pain began. The patient does not have a history of kidney stones.       Allergies:  The patient has no known drug allergies.     Medications:    Zocor  Vitamin C  MVI  Fish oil  Aspirin  Prednisolone eye drops  Ketorolac eye drops  Ofloxacin eye drops  Carboxymethylcellul-glycerin eye drops    Past Medical History:    HLD  Keratoconus  Retinal detachment  Back pain    Past Surgical History:    Right medial meniscus repair  [place] tooth extraction  Left keratoplasty penetrating  Retinal reattachment  Left vitrectomy parsplana with 23 gauge system  Left cataract iol (4/3/2017)    Family History:    CAD  HTN  Glaucoma    Social History:  Relationship status: Single  Tobacco use: Negative  Alcohol use: Positive  The patient presents via EMS with Oxana, his domestic partner.     Review of Systems   Constitutional: Positive for diaphoresis. Negative for fever.   Gastrointestinal: Positive  "for nausea. Negative for vomiting.   Genitourinary: Positive for flank pain.   All other systems reviewed and are negative.    Physical Exam   First Vitals:  BP: 141/90  Heart Rate: 51  Temp: 98.1  F (36.7  C)  Resp: 14  Height: 177.8 cm (5' 10\")  Weight: 95.3 kg (210 lb)  SpO2: 98 %    Physical Exam  Eye:  Pupils are equal, round, and reactive.  Extraocular movements intact.    ENT:  No rhinorrhea.  Moist mucus membranes.  Normal tongue and tonsil.    Cardiac:  Regular rate and rhythm.  No murmurs, gallops, or rubs.    Pulmonary:  Clear to auscultation bilaterally.  No wheezes, rales, or rhonchi.    Abdomen:  Positive bowel sounds.  Abdomen is soft and non-distended, without focal tenderness.  Mild discomfort over the right CVA.     Musculoskeletal:  Normal movement of all extremities without evidence for deficit.    Skin:  Warm and dry without rashes.    Neurologic:  Non-focal exam without asymmetric weakness or numbness.     Psychiatric:  Normal affect with appropriate interaction with examiner.      Emergency Department Course   Imaging:  Radiographic findings were communicated with the patient who voiced understanding of the findings.    CT Abdomen and Pelvis, without contrast, per radiology:   Nephrolithiasis in both kidneys. 2.5 mm stone is not visible at the right ureteropelvic junction. Minimal associated hydronephrosis.     Laboratory:  UA: Clear, yellow urine: Large Blood (A), 30 Albumin (A), Trace Leukocyte Esterase (A), >182 RBC (H), 3 WBC (H), Mucous Present (A), o/w WNL    Interventions:  0854: Normal Saline, 1000 mL, IV  0854: Toradol, 30 mg, IV injection  0855: Normal Saline, 1000 mL, IV     The patient's symptoms were improved with parenteral NSAIDs and fluids.    Emergency Department Course:  Nursing notes and vitals reviewed.  I performed an exam of the patient as documented above.  The above workup was undertaken.  0952: I rechecked the patient and discussed CT results. Patient is feeling much " better.   1042: I rechecked the patient and discussed UA results.   Findings and plan explained to the Patient and partner. Patient discharged home, status improved, with instructions regarding supportive care, medications, and reasons to return as well as the importance of close follow-up was reviewed.     Impression & Plan    Medical Decision Making:  Paulino Gomez is a 52 year old male who presented with unilateral flank & abdominal pain consistent with renal colic. CT confirms a 2.5mm ureteral stone at the right UPJ.  CT and lab workup show no other alternative etiology that could be causing his symptoms (e.g., AAA, appendicitis, pyelonephritis). There is no fever or convincing evidence of a urinary tract infection. On recheck, his pain is controlled with interventions in the ED and he is tolerating POs. I will prescribe supportive medications and Flomax to facilitate stone passage. I have advised him to return for uncontrolled pain, vomiting, fever, or any other concerning symptoms. I also advised to strain his urine to look for a stone and submit it to his primary doctor for lab analysis.  Finally, I have advised follow up with urology within 3-5 days.     Diagnosis:    ICD-10-CM   1. Ureterolithiasis N20.1     Disposition:  Discharge to home with urology follow up.     Discharge Medications:  New Prescriptions    OXYCODONE-ACETAMINOPHEN (PERCOCET) 5-325 MG PER TABLET    Take 1-2 tablets by mouth every 4 hours as needed for moderate to severe pain    TAMSULOSIN (FLOMAX) 0.4 MG CAPSULE    Take 1 capsule (0.4 mg) by mouth daily for 10 doses       Nina ELDRIDGE am serving as a scribe on 5/11/2017 at 8:49 AM to personally document services performed by Trierweiler, Chad A, MD, based on my observations and the provider's statements to me.     EMERGENCY DEPARTMENT     Trierweiler, Chad A, MD  05/11/17 9438

## 2017-05-11 NOTE — PROGRESS NOTES
Assessment / Plan:    Paulino Gomez is a 52 year old male who is 1 week s/p cataract extraction with intraocular lens placement left eye under PK.    Doing well.    Medications in the surgical eye:        prednisolone acetate 1% (white / pink cap): begin taper: three times a day for one week, twice a day for one week, daily (stay on daily due to PK)     Ketorolac 0.4% (grey cap):  three times a day for one week, twice a day for one week, daily for one week       Ofloxacin (tan cap): STOP    OK to discontinue eye shield  OK to resume normal activity  Avoid swimming pools, hot tubs, saunas for 3 additional weeks    Return to clinic in approximately 3-4 weeks, earlier as needed.  Plan dilated fundus exam and manifest refraction in the operative eye at next visit.        ~~~~~~~~~~~~~~~~~~~~~~~~~~~~~~~~~~~~~~~~~~~~~~~~~~~~~~~~~~~~~~~~    Complete documentation of historical and exam elements from today's encounter can be found in the full encounter summary report (not reduplicated in this progress note). I personally obtained the chief complaint(s) and history of present illness.  I confirmed and edited as necessary the review of systems, past medical/surgical history, family history, social history, and examination findings as documented by others.  I examined the patient myself, and I personally reviewed the relevant tests, images, and reports as documented above. I formulated and edited as necessary the assessment and plan and discussed the findings and management plan with the patient and family.     Reginald Cabezas MD, MA  Director, Cornea & Anterior Segment  [facility] Department of Ophthalmology & Visual Neuroscience

## 2017-05-11 NOTE — PATIENT INSTRUCTIONS
prednisolone acetate 1% (white / pink cap): begin taper: three times a day for one week, twice a day for one week, daily (stay on daily due to transplant)     Ketorolac 0.4% (grey cap): three times a day for one week, twice a day for one week, daily for one week then stop     Ofloxacin (tan cap): STOP

## 2017-05-11 NOTE — ED AVS SNAPSHOT
Emergency Department    [address] [facility] [address]    Phone:  [phone]    Fax:  [phone]                                       Paulino Gomez   MRN: [id number]    Department:   Emergency Department   Date of Visit:  5/11/2017           Patient Information     Date Of Birth          1965        Your diagnoses for this visit were:     Ureterolithiasis        You were seen by Trierweiler, Chad A, MD.      Follow-up Information     Follow up with Sadiq Alcaraz MD. Schedule an appointment as soon as possible for a visit in 1 week.    Specialty:  Urology    Contact information:    [facility]  [address]  [facility] [address] [phone]          Follow up with  Emergency Department.    Specialty:  EMERGENCY MEDICINE    Why:  If symptoms worsen    Contact information:    [address] [facility] [address] [phone]        Discharge Instructions         Discharge Instructions  Kidney Stones    Kidney stones are a common problem that can cause a lot of pain but fortunately are usually not dangerous and can be generally treated with medicine at home.  However, sometimes your condition may be worse than it seemed at first, or may get worse with time.     You need to follow-up with your regular doctor within 3 days.    Most kidney stones will pass on their own, but occasionally stones may need to be removed by an urologist. We will send you home with a urine strainer. Be sure to urinate into this, or urinate into a container and pour the urine through the fine filter to catch the kidney stone as it comes out. The stone will seem like a pebble or grain of sand. Be sure to save this in a Ziploc  bag and take it to the doctor s office with you.       Return to the Emergency Department if:    Your pain is not controlled.    You are vomiting and can t keep fluids or medications down.    You develop fever (>101).    You feel much more ill or  develop new symptoms.  What can I do to help myself?    Be sure to drink plenty of fluids.    Staying active is good, and may help the stone to pass. You may do whatever you feel up to doing without restrictions.   Treatment:    Non-steroidal anti-inflammatory drugs (NSAIDs). This includes prescription medicines like Toradol  (ketorolac) and non-prescription medicines like Advil  (ibuprofen) and Nuprin  (ibuprofen). These pain relievers are very effective for kidney stones.    Narcotic pain pills. If you have been given a narcotic such as Vicodin  (hydrocodone with acetaminophen), Percocet  (oxycodone with acetaminophen), or codeine, do not drive for four hours after you have taken it. If the narcotic contains Tylenol  (acetaminophen), do not take Tylenol  with it. All narcotics will cause constipation, so eat a high fiber diet.      Nausea medication.  Nausea and vomiting are common with kidney stones, so your physician may send you home with medicine for this.     Flomax  (tamsulosin). This medicine is sometimes used for men with prostate problems, but also can help kidney stones to pass. This medicine can lower blood pressure, and you may feel faint, especially when you first stand up. Be sure to get up gradually, sit down if you feel faint, and avoid activity where feeling faint would be dangerous, such as climbing ladders.   If you were given a prescription for medicine here today, be sure to read all of the information (including the package insert) that comes with your prescription.  This will include important information about the medicine, its side effects, and any warnings that you need to know about.  The pharmacist who fills the prescription can provide more information and answer questions you may have about the medicine.  If you have questions or concerns that the pharmacist cannot address, please call or return to the Emergency Department.   Opioid Medication Information    Pain medications are among  the most commonly prescribed medicines, so we are including this information for all our patients. If you did not receive pain medication or get a prescription for pain medicine, you can ignore it.     You may have been given a prescription for an opioid (narcotic) pain medicine and/or have received a pain medicine while here in the Emergency Department. These medicines can make you drowsy or impaired. You must not drive, operate dangerous equipment, or engage in any other dangerous activities while taking these medications. If you drive while taking these medications, you could be arrested for DUI, or driving under the influence. Do not drink any alcohol while you are taking these medications.     Opioid pain medications can cause addiction. If you have a history of chemical dependency of any type, you are at a higher risk of becoming addicted to pain medications.  Only take these prescribed medications to treat your pain when all other options have been tried. Take it for as short a time and as few doses as possible. Store your pain pills in a secure place, as they are frequently stolen and provide a dangerous opportunity for children or visitors in your house to start abusing these powerful medications. We will not replace any lost or stolen medicine.  As soon as your pain is better, you should flush all your remaining medication.     Many prescription pain medications contain Tylenol  (acetaminophen), including Vicodin , Tylenol #3 , Norco , Lortab , and Percocet .  You should not take any extra pills of Tylenol  if you are using these prescription medications or you can get very sick.  Do not ever take more than 3000 mg of acetaminophen in any 24 hour period.    All opioids tend to cause constipation. Drink plenty of water and eat foods that have a lot of fiber, such as fruits, vegetables, prune juice, apple juice and high fiber cereal.  Take a laxative if you don t move your bowels at least every other day.  Miralax , Milk of Magnesia, Colace , or Senna  can be used to keep you regular.      Remember that you can always come back to the Emergency Department if you are not able to see your regular doctor in the amount of time listed above, if you get any new symptoms, or if there is anything that worries you.        Future Appointments        Provider Department Dept Phone Center    5/11/2017 2:00 PM Reignald Cabezas MD [facility] [phone] [facility]    5/15/2017 3:30 PM Reese Moser MD [facility] [phone] [facility]      24 Hour Appointment Hotline       To make an appointment at any [facility], call [phone] ([phone]). If you don't have a family doctor or clinic, we will help you find one. [facility] are conveniently located to serve the needs of you and your family.             Review of your medicines      START taking        Dose / Directions Last dose taken    oxyCODONE-acetaminophen 5-325 MG per tablet   Commonly known as:  PERCOCET   Dose:  1-2 tablet   Quantity:  20 tablet        Take 1-2 tablets by mouth every 4 hours as needed for moderate to severe pain   Refills:  0        tamsulosin 0.4 MG capsule   Commonly known as:  FLOMAX   Dose:  0.4 mg   Quantity:  10 capsule        Take 1 capsule (0.4 mg) by mouth daily for 10 doses   Refills:  0          Our records show that you are taking the medicines listed below. If these are incorrect, please call your family doctor or clinic.        Dose / Directions Last dose taken    aspirin 81 MG tablet   Dose:  1 tablet        Take 1 tablet by mouth daily.   Refills:  0        carboxymethylcellul-glycerin 0.5-0.9 % Soln ophthalmic solution   Commonly known as:  OPTIVE/REFRESH OPTIVE   Dose:  1 drop        1 drop as needed   Refills:  0        FISH OIL PO   Dose:  1 tablet        Take 1 tablet by mouth daily   Refills:  0        ketorolac 0.5 % ophthalmic solution   Commonly known as:  ACULAR   Dose:  1 drop    Quantity:  5 mL        Place 1 drop Into the left eye 4 times daily   Refills:  0        MULTIVITAMIN PO   Dose:  1 tablet        Take 1 tablet by mouth daily   Refills:  0        ofloxacin 0.3 % ophthalmic solution   Commonly known as:  OCUFLOX   Dose:  1 drop   Quantity:  1 Bottle        Place 1 drop Into the left eye 4 times daily   Refills:  0        prednisoLONE acetate 1 % ophthalmic susp   Commonly known as:  PRED FORTE   Dose:  1 drop        Place 1 drop Into the left eye 4 times daily   Refills:  0        prednisoLONE acetate 1 % ophthalmic susp   Commonly known as:  PRED FORTE   Dose:  1 drop   Quantity:  5 mL        Place 1 drop Into the left eye daily   Refills:  11        simvastatin 40 MG tablet   Commonly known as:  ZOCOR        Refills:  4        VITAMIN C PO   Dose:  1 tablet        Take 1 tablet by mouth daily   Refills:  0                Prescriptions were sent or printed at these locations (2 Prescriptions)                   Other Prescriptions                Printed at Department/Unit printer (2 of 2)         tamsulosin (FLOMAX) 0.4 MG capsule               oxyCODONE-acetaminophen (PERCOCET) 5-325 MG per tablet                Procedures and tests performed during your visit     CT Abdomen Pelvis w/o Contrast    Peripheral IV: Standard    UA reflex to Microscopic      Orders Needing Specimen Collection     None      Pending Results     No orders found from 5/9/2017 to 5/12/2017.            Pending Culture Results     No orders found from 5/9/2017 to 5/12/2017.            Pending Results Instructions     If you had any lab results that were not finalized at the time of your Discharge, you can call the ED Lab Result RN at [phone]. You will be contacted by this team for any positive Lab results or changes in treatment. The nurses are available 7 days a week from 10A to 6:30P.  You can leave a message 24 hours per day and they will return your call.        Test Results From Your Hospital Stay         5/11/2017 10:01 AM      Narrative     CT ABDOMEN/PELVIS WITHOUT CONTRAST May 11, 2017 at 0918 hours    HISTORY: 52-year-old patient with right flank pain, new onset.    COMPARISON: None.    TECHNIQUE: Axial and coronal noncontrast CT images obtained from the  lung bases through the abdomen and pelvis. Radiation dose for this  scan was reduced using automated exposure control, adjustment of the  mA and/or kV according to patient size, or iterative reconstruction  technique.    FINDINGS: The lung bases are clear. Heart size is normal. The liver,  gallbladder, spleen, adrenal glands, and pancreas are unremarkable.  Numerous nonobstructing small calculi are noted scattered throughout  both kidneys numbering approximately five in the right kidney and four  in the left kidney. A tiny calcification is visible at the right  ureteropelvic junction, measuring 2.5 mm. Minimal associated  hydronephrosis and essentially no perinephric fat stranding. The  remainder of the right ureter is unremarkable, without hydroureter.  The left ureter is also unremarkable. No intraperitoneal fluid or air.  Intervertebral disc space narrowing in the lower lumbar spine. The  bladder is decompressed. No dilated loops of bowel.        Impression     IMPRESSION: Nephrolithiasis in both kidneys. 2.5 mm stone is not  visible at the right ureteropelvic junction. Minimal associated  hydronephrosis.    KEANU BURNS MD         5/11/2017 10:22 AM      Component Results     Component Value Ref Range & Units Status    Color Urine Yellow  Final    Appearance Urine Clear  Final    Glucose Urine Negative NEG mg/dL Final    Bilirubin Urine Negative NEG Final    Ketones Urine Negative NEG mg/dL Final    Specific Gravity Urine 1.021 1.003 - 1.035 Final    Blood Urine Large (A) NEG Final    pH Urine 6.0 5.0 - 7.0 pH Final    Protein Albumin Urine 30 (A) NEG mg/dL Final    Urobilinogen mg/dL Normal 0.0 - 2.0 mg/dL Final    Nitrite Urine Negative NEG Final     Leukocyte Esterase Urine Trace (A) NEG Final    Source Midstream Urine  Final    RBC Urine >182 (H) 0 - 2 /HPF Final    WBC Urine 3 (H) 0 - 2 /HPF Final    Mucous Urine Present (A) NEG /LPF Final                Clinical Quality Measure: Blood Pressure Screening     Your blood pressure was checked while you were in the emergency department today. The last reading we obtained was  BP: 141/90 . Please read the guidelines below about what these numbers mean and what you should do about them.  If your systolic blood pressure (the top number) is less than 120 and your diastolic blood pressure (the bottom number) is less than 80, then your blood pressure is normal. There is nothing more that you need to do about it.  If your systolic blood pressure (the top number) is 120-139 or your diastolic blood pressure (the bottom number) is 80-89, your blood pressure may be higher than it should be. You should have your blood pressure rechecked within a year by a primary care provider.  If your systolic blood pressure (the top number) is 140 or greater or your diastolic blood pressure (the bottom number) is 90 or greater, you may have high blood pressure. High blood pressure is treatable, but if left untreated over time it can put you at risk for heart attack, stroke, or kidney failure. You should have your blood pressure rechecked by a primary care provider within the next 4 weeks.  If your provider in the emergency department today gave you specific instructions to follow-up with your doctor or provider even sooner than that, you should follow that instruction and not wait for up to 4 weeks for your follow-up visit.        Thank you for choosing [facility]       Thank you for choosing [facility] for your care. Our goal is always to provide you with excellent care. Hearing back from our patients is one way we can continue to improve our services. Please take a few minutes to complete the written survey that you may receive in the  "mail after you visit with us. Thank you!        [email or web address] Information     [employer] lets you send messages to your doctor, view your test results, renew your prescriptions, schedule appointments and more. To sign up, go to [email or web address] . Click on \"Log in\" on the left side of the screen, which will take you to the Welcome page. Then click on \"Sign up Now\" on the right side of the page.     You will be asked to enter the access code listed below, as well as some personal information. Please follow the directions to create your username and password.     Your access code is: [email or web address]  Expires: 2017  8:30 AM     Your access code will  in 90 days. If you need help or a new code, please call your [place] [facility] clinic or [phone].        Care EveryWhere ID     This is your Care EveryWhere ID. This could be used by other organizations to access your [facility] medical records  [id number]        After Visit Summary       This is your record. Keep this with you and show to your community pharmacist(s) and doctor(s) at your next visit.                  "

## 2017-05-11 NOTE — ED AVS SNAPSHOT
Emergency Department    [address] [place] [address]    Phone:  [phone]    Fax:  [phone]                                       Paulino Gomez   MRN: [id number]    Department:   Emergency Department   Date of Visit:  5/11/2017           After Visit Summary Signature Page     I have received my discharge instructions, and my questions have been answered. I have discussed any challenges I see with this plan with the nurse or doctor.    ..........................................................................................................................................  Patient/Patient Representative Signature      ..........................................................................................................................................  Patient Representative Print Name and Relationship to Patient    ..................................................               ................................................  Date                                            Time    ..........................................................................................................................................  Reviewed by Signature/Title    ...................................................              ..............................................  Date                                                            Time

## 2017-06-06 ENCOUNTER — OFFICE VISIT (OUTPATIENT)
Dept: OPHTHALMOLOGY | Facility: CLINIC | Age: 52
End: 2017-06-06
Attending: OPHTHALMOLOGY
Payer: COMMERCIAL

## 2017-06-06 DIAGNOSIS — Z94.7 S/P PKP (PENETRATING KERATOPLASTY): ICD-10-CM

## 2017-06-06 DIAGNOSIS — Z96.1 PSEUDOPHAKIA: Primary | ICD-10-CM

## 2017-06-06 PROCEDURE — 92015 DETERMINE REFRACTIVE STATE: CPT | Mod: ZF

## 2017-06-06 PROCEDURE — 99213 OFFICE O/P EST LOW 20 MIN: CPT | Mod: ZF

## 2017-06-06 ASSESSMENT — EXTERNAL EXAM - LEFT EYE: OS_EXAM: NORMAL

## 2017-06-06 ASSESSMENT — REFRACTION_MANIFEST
OS_AXIS: 040
OS_SPHERE: -10.00
OS_CYLINDER: +8.00
OS_ADD: +2.50
OD_AXIS: 007
OD_SPHERE: -8.50
OD_ADD: +2.50
OD_CYLINDER: +4.00

## 2017-06-06 ASSESSMENT — VISUAL ACUITY
CORRECTION_TYPE: GLASSES
OS_PH_SC: 20/70-
METHOD: SNELLEN - LINEAR
OD_PH_CC: 20/20-3
OD_CC: 20/30+
OS_SC: 20/400

## 2017-06-06 ASSESSMENT — EXTERNAL EXAM - RIGHT EYE: OD_EXAM: NORMAL

## 2017-06-06 ASSESSMENT — REFRACTION_WEARINGRX
OS_CYLINDER: +5.50
OS_SPHERE: -10.25
OD_ADD: +2.00
OS_ADD: +2.00
OS_AXIS: 176
OD_SPHERE: -9.00
SPECS_TYPE: PAL
OD_CYLINDER: +3.75
OD_AXIS: 170

## 2017-06-06 ASSESSMENT — TONOMETRY
OD_IOP_MMHG: 18
OS_IOP_MMHG: 10
IOP_METHOD: ICARE

## 2017-06-06 ASSESSMENT — SLIT LAMP EXAM - LIDS
COMMENTS: NORMAL
COMMENTS: NORMAL

## 2017-06-06 ASSESSMENT — CUP TO DISC RATIO: OS_RATIO: 0.4

## 2017-06-06 NOTE — PROGRESS NOTES
Assessment / Plan:    Paulino Gomez is a 52 year old male who is 1 month s/p cataract extraction with intraocular lens placement left eye under PK.    Keratoconus  H/o RD repair left eye - stable    Doing well      Refer to Dr. Arthur for contact lens fitting

## 2017-06-06 NOTE — MR AVS SNAPSHOT
After Visit Summary   2017    Paulino Gomez    MRN: [id number]           Patient Information     Date Of Birth          1965        Visit Information        Provider Department      2017 7:45 AM Reginald Cabezas MD [facility]        Today's Diagnoses     Pseudophakia    -  1    S/P PKP (penetrating keratoplasty)           Follow-ups after your visit        Follow-up notes from your care team     Return in about 4 months (around 10/6/2017) for corneal topography OS, transplant followup.      Your next 10 appointments already scheduled     Oct 03, 2017  7:30 AM CDT   RETURN CORNEA with Reginald Cabezas MD   Eye Clinic ([facility])    [address]  [address]  [facility] [address] [phone]              Who to contact     Please call your clinic at [phone] to:    Ask questions about your health    Make or cancel appointments    Discuss your medicines    Learn about your test results    Speak to your doctor   If you have compliments or concerns about an experience at your clinic, or if you wish to file a complaint, please contact [facility] Patient Relations at [phone] or email us at [email or web address]         Additional Information About Your Visit        MyChart Information     [employer] is an electronic gateway that provides easy, online access to your medical records. With [employer], you can request a clinic appointment, read your test results, renew a prescription or communicate with your care team.     To sign up for [employer] visit the website at [email or web address]   You will be asked to enter the access code listed below, as well as some personal information. Please follow the directions to create your username and password.     Your access code is: [email or web address]  Expires: 2017  8:30 AM     Your access code will  in 90 days. If you need help or a new code, please  contact your [facility] or call [phone] for assistance.        Care EveryWhere ID     This is your Care EveryWhere ID. This could be used by other organizations to access your [facility] [place] medical records  [id number]         Blood Pressure from Last 3 Encounters:   05/11/17 124/81   05/03/17 132/90   02/13/14 113/72    Weight from Last 3 Encounters:   05/11/17 95.3 kg (210 lb)   05/03/17 95.3 kg (210 lb)   02/13/14 93 kg (205 lb)              Today, you had the following     No orders found for display         Today's Medication Changes          These changes are accurate as of: 6/6/17 11:59 PM.  If you have any questions, ask your nurse or doctor.               Stop taking these medicines if you haven't already. Please contact your care team if you have questions.     ketorolac 0.5 % ophthalmic solution   Commonly known as:  ACULAR   Stopped by:  Reginald Cabezas MD           ofloxacin 0.3 % ophthalmic solution   Commonly known as:  OCUFLOX   Stopped by:  Reginald Cabezas MD           tamsulosin 80 mcg/mL Susp   Commonly known as:  FLOMAX   Stopped by:  Reginald Cabezas MD                    Primary Care Provider Office Phone # Fax #    Reese Moser [phone]       [facility] [address] [facility] [address]        Thank you!     Thank you for choosing [facility]  for your care. Our goal is always to provide you with excellent care. Hearing back from our patients is one way we can continue to improve our services. Please take a few minutes to complete the written survey that you may receive in the mail after your visit with us. Thank you!             Your Updated Medication List - Protect others around you: Learn how to safely use, store and throw away your medicines at www.disposemymeds.org.          This list is accurate as of: 6/6/17 11:59 PM.  Always use your most recent med list.                   Brand Name Dispense  Instructions for use    aspirin 81 MG tablet      Take 1 tablet by mouth daily.       carboxymethylcellul-glycerin 0.5-0.9 % Soln ophthalmic solution    OPTIVE/REFRESH OPTIVE     1 drop as needed       FISH OIL PO      Take 1 tablet by mouth daily       MULTIVITAMIN PO      Take 1 tablet by mouth daily       * oxyCODONE-acetaminophen 5-325 MG per tablet    PERCOCET     Take 325 tablets by mouth every 4 hours as needed       * oxyCODONE-acetaminophen 5-325 MG per tablet    PERCOCET    20 tablet    Take 1-2 tablets by mouth every 4 hours as needed for moderate to severe pain       prednisoLONE acetate 1 % ophthalmic susp    PRED FORTE     Place 1 drop Into the left eye 4 times daily       prednisoLONE acetate 1 % ophthalmic susp    PRED FORTE    5 mL    Place 1 drop Into the left eye daily       simvastatin 40 MG tablet    ZOCOR         VITAMIN C PO      Take 1 tablet by mouth daily       * Notice:  This list has 2 medication(s) that are the same as other medications prescribed for you. Read the directions carefully, and ask your doctor or other care provider to review them with you.

## 2017-06-06 NOTE — NURSING NOTE
Chief Complaints and History of Present Illnesses   Patient presents with     Post Op (Ophthalmology) Left Eye     5/3/17 CEIOL      HPI    Affected eye(s):  Left   Symptoms:     No decreased vision   Foreign body sensation         Do you have eye pain now?:  No      Comments:  VA seems stable. Some scratchiness LUBNA YA June 6, 2017 7:58 AM

## 2017-10-03 ENCOUNTER — OFFICE VISIT (OUTPATIENT)
Dept: OPHTHALMOLOGY | Facility: CLINIC | Age: 52
End: 2017-10-03
Attending: OPHTHALMOLOGY
Payer: COMMERCIAL

## 2017-10-03 DIAGNOSIS — Z94.7 S/P PKP (PENETRATING KERATOPLASTY): ICD-10-CM

## 2017-10-03 DIAGNOSIS — T85.398A EXTRUSION OF SCLERAL BUCKLE, INITIAL ENCOUNTER: ICD-10-CM

## 2017-10-03 DIAGNOSIS — Z96.1 PSEUDOPHAKIA: Primary | ICD-10-CM

## 2017-10-03 PROCEDURE — 92025 CPTRIZED CORNEAL TOPOGRAPHY: CPT | Mod: ZF | Performed by: OPHTHALMOLOGY

## 2017-10-03 PROCEDURE — 99213 OFFICE O/P EST LOW 20 MIN: CPT | Mod: ZF

## 2017-10-03 RX ORDER — OFLOXACIN 3 MG/ML
1 SOLUTION/ DROPS OPHTHALMIC 4 TIMES DAILY
Qty: 1 BOTTLE | Refills: 0 | Status: SHIPPED | OUTPATIENT
Start: 2017-10-03 | End: 2018-05-30

## 2017-10-03 ASSESSMENT — VISUAL ACUITY
CORRECTION_TYPE: GLASSES
METHOD: SNELLEN - LINEAR
OS_CC+: -2
OD_CC: 20/20
OS_CC: 20/50
OD_CC+: -3
OS_PH_CC: 20/40-2+2

## 2017-10-03 ASSESSMENT — SLIT LAMP EXAM - LIDS
COMMENTS: MILD UL EDEMA
COMMENTS: NORMAL

## 2017-10-03 ASSESSMENT — EXTERNAL EXAM - RIGHT EYE: OD_EXAM: NORMAL

## 2017-10-03 ASSESSMENT — REFRACTION_WEARINGRX
OD_AXIS: 005
OS_ADD: +2.50
SPECS_TYPE: PAL
OD_SPHERE: -8.50
OS_SPHERE: -9.75
OS_AXIS: 038
OS_CYLINDER: +7.75
OD_ADD: +2.50
OD_CYLINDER: +4.00

## 2017-10-03 ASSESSMENT — TONOMETRY
IOP_METHOD: ICARE
OS_IOP_MMHG: 12
OD_IOP_MMHG: 14

## 2017-10-03 ASSESSMENT — EXTERNAL EXAM - LEFT EYE: OS_EXAM: NORMAL

## 2017-10-03 NOTE — MR AVS SNAPSHOT
After Visit Summary   10/3/2017    Paulino Gomez    MRN: [id number]           Patient Information     Date Of Birth          1965        Visit Information        Provider Department      10/3/2017 7:30 AM Reginald Cabezas MD [facility]        Today's Diagnoses     Pseudophakia    -  1    S/P PKP (penetrating keratoplasty)        Extrusion of scleral buckle, initial encounter           Follow-ups after your visit        Follow-up notes from your care team     Return in about 3 months (around 1/3/2018) for Follow Up.      Your next 10 appointments already scheduled     Oct 04, 2017  7:45 AM CDT   RETURN RETINA with Lina Novak MD   Eye Clinic ([facility])    [facility]  [address]  [address]  [facility] [id number] [phone]              Who to contact     Please call your clinic at [phone] to:    Ask questions about your health    Make or cancel appointments    Discuss your medicines    Learn about your test results    Speak to your doctor   If you have compliments or concerns about an experience at your clinic, or if you wish to file a complaint, please contact [facility] Patient Relations at [phone] or email us at [email or web address]         Additional Information About Your Visit        MyChart Information     [employer] is an electronic gateway that provides easy, online access to your medical records. With [employer], you can request a clinic appointment, read your test results, renew a prescription or communicate with your care team.     To sign up for [employer] visit the website at [email or web address]   You will be asked to enter the access code listed below, as well as some personal information. Please follow the directions to create your username and password.     Your access code is: [email or web address]  Expires: 2017  6:31 AM     Your access code will  in 90 days. If you need  help or a new code, please contact your [facility] or call [phone] for assistance.        Care EveryWhere ID     This is your Care EveryWhere ID. This could be used by other organizations to access your [place] medical records  [id number]         Blood Pressure from Last 3 Encounters:   05/11/17 124/81   05/03/17 132/90   02/13/14 113/72    Weight from Last 3 Encounters:   05/11/17 95.3 kg (210 lb)   05/03/17 95.3 kg (210 lb)   02/13/14 93 kg (205 lb)              We Performed the Following     Corneal Topography OS (left eye)          Today's Medication Changes          These changes are accurate as of: 10/3/17  8:55 AM.  If you have any questions, ask your nurse or doctor.               Start taking these medicines.        Dose/Directions    ofloxacin 0.3 % ophthalmic solution   Commonly known as:  OCUFLOX   Used for:  Extrusion of scleral buckle, initial encounter   Started by:  Reginald Cabezas MD        Dose:  1 drop   Place 1 drop Into the left eye 4 times daily   Quantity:  1 Bottle   Refills:  0            Where to get your medicines      These medications were sent to [facility] [address] [place] [address] [place] AT [facility] [place]  [facility] [place] [address]     Phone:  [phone]     ofloxacin 0.3 % ophthalmic solution                Primary Care Provider Office Phone # Fax #    Reese Moser [phone] [address]  [facility] [address]        Equal Access to Services     [facility] AH: Hadii aad ku hadasho Soomaali, waaxda luqadaha, qaybta kaalmada adecollette, gabe clark. So [facility] [phone].    ATENCIÓN: Si habla español, tiene a lucio disposición servicios gratuitos de asistencia lingüística. Llame al [phone].    We comply with applicable federal civil rights laws and [place] laws. We do not discriminate on the basis of race, color, national origin, age,  disability, sex, sexual orientation, or gender identity.            Thank you!     Thank you for choosing EYE CLINIC  for your care. Our goal is always to provide you with excellent care. Hearing back from our patients is one way we can continue to improve our services. Please take a few minutes to complete the written survey that you may receive in the mail after your visit with us. Thank you!             Your Updated Medication List - Protect others around you: Learn how to safely use, store and throw away your medicines at www.disposemymeds.org.          This list is accurate as of: 10/3/17  8:55 AM.  Always use your most recent med list.                   Brand Name Dispense Instructions for use Diagnosis    aspirin 81 MG tablet      Take 1 tablet by mouth daily.        carboxymethylcellul-glycerin 0.5-0.9 % Soln ophthalmic solution    OPTIVE/REFRESH OPTIVE     1 drop as needed        FISH OIL PO      Take 1 tablet by mouth daily        MULTIVITAMIN PO      Take 1 tablet by mouth daily        ofloxacin 0.3 % ophthalmic solution    OCUFLOX    1 Bottle    Place 1 drop Into the left eye 4 times daily    Extrusion of scleral buckle, initial encounter       oxyCODONE-acetaminophen 5-325 MG per tablet    PERCOCET    20 tablet    Take 1-2 tablets by mouth every 4 hours as needed for moderate to severe pain        prednisoLONE acetate 1 % ophthalmic susp    PRED FORTE    5 mL    Place 1 drop Into the left eye daily    History of penetrating keratoplasty       simvastatin 40 MG tablet    ZOCOR          UNKNOWN TO PATIENT      Take 1 tablet by mouth 3 times daily Kidney stone medication - name unknown        VITAMIN C PO      Take 1 tablet by mouth daily

## 2017-10-03 NOTE — NURSING NOTE
"Chief Complaints and History of Present Illnesses   Patient presents with     Follow Up For     PKP f/u with corneal freddie OS     HPI    Affected eye(s):  Left   Symptoms:     Distorted vision (Comment: wavy vision noted since surgery)   Redness (Comment: left eye x 2-3 weeks)   Eye discharge (Comment: left eye x 2-3 weeks)      Duration:  4 months   Frequency:  Constant       Do you have eye pain now?:  No      Comments:  4 month f/u PKP, left eye  Pt notes wavy vision now - since the surgery  Pt notes a slight amount of pain while trying to \"get the goop out\" - has had some redness and discharge x 2-3 weeks  Pt will develop a headache by the end of the day if in meetings and on computer for extended periods    Ocular meds:  Pred Forte QD, left eye  AT's PRN    Radha YA 7:39 AM October 3, 2017              "

## 2017-10-03 NOTE — PROGRESS NOTES
CC:     HPI: Paulino Gomez is a 52 year old male who presents for follow up of left eye PK 2 years ago and left eye CEIOL in May 2017. He notes that since cataract surgery his vision is wavy. He notes that he has left eyelid swelling, redness, and mild discharge.     POHx:  2 retinal surgeries left eye  PK OS 12/2015  LE CEIOL     Current Eye Medications:   Prednisolone once daily OS  Artificial tears PRN    Review of Testing:  Irregular astigmatism stable from last visit    Assessment & Plan   Paulino Gomez is a 52 year old male with the following diagnoses:     1.  Keratoconus s/p PK, OS and LE CEIOL     Patient doing well. Still notes wavy vision as  compared to blurry vision before LE CEIOL.    -Continue prednisolone acetate once daily   -Continue artificial tears PRN    2. Exposed scleral buckle left eye    - no discharge   - start oflox four times a day     - see Dr. Scarlet galeana - she can eval for possible macular edema as well, given c/o wavy vision.  May just be uncorrected corneal astigmatism    See Jimmy for buckle issue    Try Rigid gas permeable lens after retina issue sorted    Reginald George MD  Ophthalmology, PGY-3      ~~~~~~~~~~~~~~~~~~~~~~~~~~~~~~~~~~~~~~~~~~~~~~~~~~~~~~~~~~~~~~~~      Complete documentation of historical and exam elements from today's encounter can be found in the full encounter summary report (not reduplicated in this progress note). I personally obtained the chief complaint(s) and history of present illness.  I confirmed and edited as necessary the review of systems, past medical/surgical history, family history, social history, and examination findings as documented by others.  I examined the patient myself, and I personally reviewed the relevant tests, images, and reports as documented above. I formulated and edited as necessary the assessment and plan and discussed the findings and management plan with the patient and family.     I personally interpreted the diagnostic  / imaging study and have edited the interpretation as needed.    Reginald Cabezas MD, MA  Director, Cornea & Anterior Segment  [facility] Department of Ophthalmology & Visual Neuroscience

## 2017-10-04 ENCOUNTER — ANESTHESIA EVENT (OUTPATIENT)
Dept: SURGERY | Facility: CLINIC | Age: 52
End: 2017-10-04
Payer: COMMERCIAL

## 2017-10-04 ENCOUNTER — SURGERY (OUTPATIENT)
Age: 52
End: 2017-10-04

## 2017-10-04 ENCOUNTER — HOSPITAL ENCOUNTER (OUTPATIENT)
Facility: CLINIC | Age: 52
Discharge: HOME | End: 2017-10-04
Attending: OPHTHALMOLOGY | Admitting: OPHTHALMOLOGY
Payer: COMMERCIAL

## 2017-10-04 ENCOUNTER — OFFICE VISIT (OUTPATIENT)
Dept: OPHTHALMOLOGY | Facility: CLINIC | Age: 52
End: 2017-10-04
Attending: OPHTHALMOLOGY
Payer: COMMERCIAL

## 2017-10-04 ENCOUNTER — ANESTHESIA (OUTPATIENT)
Dept: SURGERY | Facility: CLINIC | Age: 52
End: 2017-10-04
Payer: COMMERCIAL

## 2017-10-04 VITALS
OXYGEN SATURATION: 95 % | RESPIRATION RATE: 12 BRPM | BODY MASS INDEX: 29.35 KG/M2 | DIASTOLIC BLOOD PRESSURE: 92 MMHG | HEIGHT: 70 IN | WEIGHT: 205 LBS | SYSTOLIC BLOOD PRESSURE: 123 MMHG | TEMPERATURE: 98.2 F

## 2017-10-04 DIAGNOSIS — H35.722 RPE (RETINAL PIGMENT EPITHELIUM) DETACHMENT, LEFT: Primary | ICD-10-CM

## 2017-10-04 DIAGNOSIS — Z94.7 POST CORNEAL TRANSPLANT: ICD-10-CM

## 2017-10-04 DIAGNOSIS — T85.398A EXTRUSION OF SCLERAL BUCKLE, INITIAL ENCOUNTER: Primary | ICD-10-CM

## 2017-10-04 DIAGNOSIS — H18.603 KERATOCONUS, BILATERAL: ICD-10-CM

## 2017-10-04 DIAGNOSIS — Z96.1 PSEUDOPHAKIA: ICD-10-CM

## 2017-10-04 DIAGNOSIS — H35.722 RPE (RETINAL PIGMENT EPITHELIUM) DETACHMENT, LEFT: ICD-10-CM

## 2017-10-04 LAB
GRAM STN SPEC: NORMAL
GRAM STN SPEC: NORMAL
Lab: NORMAL
MISCELLANEOUS TEST: NORMAL
SPECIMEN SOURCE: NORMAL

## 2017-10-04 PROCEDURE — 25000125 ZZHC RX 250: Performed by: ANESTHESIOLOGY

## 2017-10-04 PROCEDURE — 37000008 ZZH ANESTHESIA TECHNICAL FEE, 1ST 30 MIN: Performed by: OPHTHALMOLOGY

## 2017-10-04 PROCEDURE — 37000009 ZZH ANESTHESIA TECHNICAL FEE, EACH ADDTL 15 MIN: Performed by: OPHTHALMOLOGY

## 2017-10-04 PROCEDURE — 27210794 ZZH OR GENERAL SUPPLY STERILE: Performed by: OPHTHALMOLOGY

## 2017-10-04 PROCEDURE — 25000125 ZZHC RX 250: Performed by: OPHTHALMOLOGY

## 2017-10-04 PROCEDURE — 87070 CULTURE OTHR SPECIMN AEROBIC: CPT | Performed by: OPHTHALMOLOGY

## 2017-10-04 PROCEDURE — 71000028 ZZH EYE RECOVERY PHASE 2 EACH 15 MINS: Performed by: OPHTHALMOLOGY

## 2017-10-04 PROCEDURE — 87075 CULTR BACTERIA EXCEPT BLOOD: CPT | Performed by: OPHTHALMOLOGY

## 2017-10-04 PROCEDURE — 27210995 ZZH RX 272: Performed by: OPHTHALMOLOGY

## 2017-10-04 PROCEDURE — 87077 CULTURE AEROBIC IDENTIFY: CPT | Performed by: OPHTHALMOLOGY

## 2017-10-04 PROCEDURE — 40000170 ZZH STATISTIC PRE-PROCEDURE ASSESSMENT II: Performed by: OPHTHALMOLOGY

## 2017-10-04 PROCEDURE — 25000128 H RX IP 250 OP 636: Performed by: ANESTHESIOLOGY

## 2017-10-04 PROCEDURE — 25000128 H RX IP 250 OP 636: Performed by: NURSE ANESTHETIST, CERTIFIED REGISTERED

## 2017-10-04 PROCEDURE — 99213 OFFICE O/P EST LOW 20 MIN: CPT | Mod: ZF

## 2017-10-04 PROCEDURE — 71000004 ZZH RECOVERY EYE PHASE 1 LEVEL 1 FIRST HR: Performed by: OPHTHALMOLOGY

## 2017-10-04 PROCEDURE — 36000098 ZZH EYE SURGERY LEVEL 2 1ST 30 MIN: Performed by: OPHTHALMOLOGY

## 2017-10-04 PROCEDURE — 36000099 ZZH EYE SURGERY LEVEL 2 EA 15 ADDTL MIN: Performed by: OPHTHALMOLOGY

## 2017-10-04 PROCEDURE — 25000128 H RX IP 250 OP 636: Performed by: OPHTHALMOLOGY

## 2017-10-04 PROCEDURE — 87186 SC STD MICRODIL/AGAR DIL: CPT | Performed by: OPHTHALMOLOGY

## 2017-10-04 PROCEDURE — 92134 CPTRZ OPH DX IMG PST SGM RTA: CPT | Mod: ZF | Performed by: OPHTHALMOLOGY

## 2017-10-04 PROCEDURE — S0020 INJECTION, BUPIVICAINE HYDRO: HCPCS | Performed by: OPHTHALMOLOGY

## 2017-10-04 RX ORDER — DIAZEPAM 10 MG/2ML
2.5 INJECTION, SOLUTION INTRAMUSCULAR; INTRAVENOUS
Status: DISCONTINUED | OUTPATIENT
Start: 2017-10-04 | End: 2017-10-04 | Stop reason: HOSPADM

## 2017-10-04 RX ORDER — HYDROMORPHONE HYDROCHLORIDE 1 MG/ML
.3-.5 INJECTION, SOLUTION INTRAMUSCULAR; INTRAVENOUS; SUBCUTANEOUS EVERY 5 MIN PRN
Status: DISCONTINUED | OUTPATIENT
Start: 2017-10-04 | End: 2017-10-04 | Stop reason: HOSPADM

## 2017-10-04 RX ORDER — ONDANSETRON 2 MG/ML
INJECTION INTRAMUSCULAR; INTRAVENOUS PRN
Status: DISCONTINUED | OUTPATIENT
Start: 2017-10-04 | End: 2017-10-04

## 2017-10-04 RX ORDER — CYCLOPENTOLATE HYDROCHLORIDE 10 MG/ML
1 SOLUTION/ DROPS OPHTHALMIC
Status: COMPLETED | OUTPATIENT
Start: 2017-10-04 | End: 2017-10-04

## 2017-10-04 RX ORDER — ACETAMINOPHEN 650 MG/1
650 SUPPOSITORY RECTAL EVERY 4 HOURS PRN
Status: DISCONTINUED | OUTPATIENT
Start: 2017-10-04 | End: 2017-10-04 | Stop reason: HOSPADM

## 2017-10-04 RX ORDER — HYDROMORPHONE HYDROCHLORIDE 1 MG/ML
.3-.5 INJECTION, SOLUTION INTRAMUSCULAR; INTRAVENOUS; SUBCUTANEOUS EVERY 10 MIN PRN
Status: DISCONTINUED | OUTPATIENT
Start: 2017-10-04 | End: 2017-10-04 | Stop reason: HOSPADM

## 2017-10-04 RX ORDER — SODIUM CHLORIDE, SODIUM LACTATE, POTASSIUM CHLORIDE, CALCIUM CHLORIDE 600; 310; 30; 20 MG/100ML; MG/100ML; MG/100ML; MG/100ML
INJECTION, SOLUTION INTRAVENOUS CONTINUOUS
Status: DISCONTINUED | OUTPATIENT
Start: 2017-10-04 | End: 2017-10-04 | Stop reason: HOSPADM

## 2017-10-04 RX ORDER — KETOROLAC TROMETHAMINE 30 MG/ML
30 INJECTION, SOLUTION INTRAMUSCULAR; INTRAVENOUS
Status: DISCONTINUED | OUTPATIENT
Start: 2017-10-04 | End: 2017-10-04 | Stop reason: HOSPADM

## 2017-10-04 RX ORDER — KETOROLAC TROMETHAMINE 30 MG/ML
30 INJECTION, SOLUTION INTRAMUSCULAR; INTRAVENOUS EVERY 6 HOURS PRN
Status: DISCONTINUED | OUTPATIENT
Start: 2017-10-04 | End: 2017-10-04 | Stop reason: HOSPADM

## 2017-10-04 RX ORDER — FENTANYL CITRATE 50 UG/ML
25-50 INJECTION, SOLUTION INTRAMUSCULAR; INTRAVENOUS
Status: DISCONTINUED | OUTPATIENT
Start: 2017-10-04 | End: 2017-10-04 | Stop reason: HOSPADM

## 2017-10-04 RX ORDER — BALANCED SALT SOLUTION 6.4; .75; .48; .3; 3.9; 1.7 MG/ML; MG/ML; MG/ML; MG/ML; MG/ML; MG/ML
SOLUTION OPHTHALMIC PRN
Status: DISCONTINUED | OUTPATIENT
Start: 2017-10-04 | End: 2017-10-04 | Stop reason: HOSPADM

## 2017-10-04 RX ORDER — SODIUM CHLORIDE, SODIUM LACTATE, POTASSIUM CHLORIDE, CALCIUM CHLORIDE 600; 310; 30; 20 MG/100ML; MG/100ML; MG/100ML; MG/100ML
INJECTION, SOLUTION INTRAVENOUS CONTINUOUS PRN
Status: DISCONTINUED | OUTPATIENT
Start: 2017-10-04 | End: 2017-10-04

## 2017-10-04 RX ORDER — PROPOFOL 10 MG/ML
INJECTION, EMULSION INTRAVENOUS PRN
Status: DISCONTINUED | OUTPATIENT
Start: 2017-10-04 | End: 2017-10-04

## 2017-10-04 RX ORDER — MEPERIDINE HYDROCHLORIDE 25 MG/ML
12.5 INJECTION INTRAMUSCULAR; INTRAVENOUS; SUBCUTANEOUS
Status: DISCONTINUED | OUTPATIENT
Start: 2017-10-04 | End: 2017-10-04 | Stop reason: HOSPADM

## 2017-10-04 RX ORDER — NEOMYCIN SULFATE, POLYMYXIN B SULFATE AND GRAMICIDIN 1.75; 10000; .025 MG/ML; [USP'U]/ML; MG/ML
SOLUTION/ DROPS OPHTHALMIC PRN
Status: DISCONTINUED | OUTPATIENT
Start: 2017-10-04 | End: 2017-10-04 | Stop reason: HOSPADM

## 2017-10-04 RX ORDER — DEXAMETHASONE SODIUM PHOSPHATE 4 MG/ML
INJECTION, SOLUTION INTRA-ARTICULAR; INTRALESIONAL; INTRAMUSCULAR; INTRAVENOUS; SOFT TISSUE PRN
Status: DISCONTINUED | OUTPATIENT
Start: 2017-10-04 | End: 2017-10-04 | Stop reason: HOSPADM

## 2017-10-04 RX ORDER — CEPHALEXIN 750 MG/1
1 CAPSULE ORAL 3 TIMES DAILY
Qty: 40 CAPSULE | Refills: 0 | Status: SHIPPED | OUTPATIENT
Start: 2017-10-04 | End: 2022-02-22

## 2017-10-04 RX ORDER — NALOXONE HYDROCHLORIDE 0.4 MG/ML
.1-.4 INJECTION, SOLUTION INTRAMUSCULAR; INTRAVENOUS; SUBCUTANEOUS
Status: DISCONTINUED | OUTPATIENT
Start: 2017-10-04 | End: 2017-10-04 | Stop reason: HOSPADM

## 2017-10-04 RX ORDER — ONDANSETRON 2 MG/ML
4 INJECTION INTRAMUSCULAR; INTRAVENOUS EVERY 30 MIN PRN
Status: DISCONTINUED | OUTPATIENT
Start: 2017-10-04 | End: 2017-10-04 | Stop reason: HOSPADM

## 2017-10-04 RX ORDER — PHENYLEPHRINE HYDROCHLORIDE 25 MG/ML
1 SOLUTION/ DROPS OPHTHALMIC
Status: COMPLETED | OUTPATIENT
Start: 2017-10-04 | End: 2017-10-04

## 2017-10-04 RX ORDER — TROPICAMIDE 10 MG/ML
1 SOLUTION/ DROPS OPHTHALMIC
Status: COMPLETED | OUTPATIENT
Start: 2017-10-04 | End: 2017-10-04

## 2017-10-04 RX ORDER — TRIAMCINOLONE ACETONIDE 40 MG/ML
INJECTION, SUSPENSION INTRA-ARTICULAR; INTRAMUSCULAR PRN
Status: DISCONTINUED | OUTPATIENT
Start: 2017-10-04 | End: 2017-10-04 | Stop reason: HOSPADM

## 2017-10-04 RX ORDER — NEOMYCIN POLYMYXIN B SULFATES AND DEXAMETHASONE 3.5; 10000; 1 MG/ML; [USP'U]/ML; MG/ML
1 SUSPENSION/ DROPS OPHTHALMIC 4 TIMES DAILY
Qty: 5 ML | Refills: 0 | Status: SHIPPED | OUTPATIENT
Start: 2017-10-04 | End: 2020-11-18

## 2017-10-04 RX ORDER — ONDANSETRON 4 MG/1
4 TABLET, ORALLY DISINTEGRATING ORAL EVERY 30 MIN PRN
Status: DISCONTINUED | OUTPATIENT
Start: 2017-10-04 | End: 2017-10-04 | Stop reason: HOSPADM

## 2017-10-04 RX ADMIN — DEXAMETHASONE SODIUM PHOSPHATE 2 MG: 4 INJECTION, SOLUTION INTRA-ARTICULAR; INTRALESIONAL; INTRAMUSCULAR; INTRAVENOUS; SOFT TISSUE at 19:17

## 2017-10-04 RX ADMIN — CYCLOPENTOLATE HYDROCHLORIDE 1 DROP: 10 SOLUTION/ DROPS OPHTHALMIC at 16:07

## 2017-10-04 RX ADMIN — CYCLOPENTOLATE HYDROCHLORIDE 1 DROP: 10 SOLUTION/ DROPS OPHTHALMIC at 15:59

## 2017-10-04 RX ADMIN — PROPOFOL 40 MG: 10 INJECTION, EMULSION INTRAVENOUS at 18:42

## 2017-10-04 RX ADMIN — SODIUM CHLORIDE, POTASSIUM CHLORIDE, SODIUM LACTATE AND CALCIUM CHLORIDE: 600; 310; 30; 20 INJECTION, SOLUTION INTRAVENOUS at 16:26

## 2017-10-04 RX ADMIN — Medication 5.5 MG: at 19:17

## 2017-10-04 RX ADMIN — FENTANYL CITRATE 50 MCG: 50 INJECTION, SOLUTION INTRAMUSCULAR; INTRAVENOUS at 19:47

## 2017-10-04 RX ADMIN — MIDAZOLAM HYDROCHLORIDE 1 MG: 1 INJECTION, SOLUTION INTRAMUSCULAR; INTRAVENOUS at 18:56

## 2017-10-04 RX ADMIN — MIDAZOLAM HYDROCHLORIDE 1 MG: 1 INJECTION, SOLUTION INTRAMUSCULAR; INTRAVENOUS at 18:40

## 2017-10-04 RX ADMIN — TRIAMCINOLONE ACETONIDE 0.5 ML: 40 INJECTION, SUSPENSION INTRA-ARTICULAR; INTRAMUSCULAR at 19:19

## 2017-10-04 RX ADMIN — LIDOCAINE HYDROCHLORIDE,EPINEPHRINE BITARTRATE 6 ML GIVEN: 20; .005 INJECTION, SOLUTION EPIDURAL; INFILTRATION; INTRACAUDAL; PERINEURAL at 19:16

## 2017-10-04 RX ADMIN — LIDOCAINE HYDROCHLORIDE 1 ML: 10 INJECTION, SOLUTION EPIDURAL; INFILTRATION; INTRACAUDAL; PERINEURAL at 16:26

## 2017-10-04 RX ADMIN — PROPOFOL 40 MG: 10 INJECTION, EMULSION INTRAVENOUS at 18:44

## 2017-10-04 RX ADMIN — PHENYLEPHRINE HYDROCHLORIDE 1 DROP: 2.5 SOLUTION/ DROPS OPHTHALMIC at 15:59

## 2017-10-04 RX ADMIN — TROPICAMIDE 1 DROP: 10 SOLUTION/ DROPS OPHTHALMIC at 16:26

## 2017-10-04 RX ADMIN — TROPICAMIDE 1 DROP: 10 SOLUTION/ DROPS OPHTHALMIC at 16:07

## 2017-10-04 RX ADMIN — CYCLOPENTOLATE HYDROCHLORIDE 1 DROP: 10 SOLUTION/ DROPS OPHTHALMIC at 16:26

## 2017-10-04 RX ADMIN — NEOMYCIN SULFATE, POLYMYXIN B SULFATE AND GRAMICIDIN 2 DROP: 1.75; 10000; .025 SOLUTION/ DROPS OPHTHALMIC at 19:17

## 2017-10-04 RX ADMIN — WATER 0.5 ML: 1 INJECTION INTRAMUSCULAR; INTRAVENOUS; SUBCUTANEOUS at 19:16

## 2017-10-04 RX ADMIN — ONDANSETRON 4 MG: 2 INJECTION INTRAMUSCULAR; INTRAVENOUS at 18:40

## 2017-10-04 RX ADMIN — PHENYLEPHRINE HYDROCHLORIDE 1 DROP: 2.5 SOLUTION/ DROPS OPHTHALMIC at 16:07

## 2017-10-04 RX ADMIN — BALANCED SALT SOLUTION 15 ML: 6.4; .75; .48; .3; 3.9; 1.7 SOLUTION OPHTHALMIC at 19:16

## 2017-10-04 RX ADMIN — PHENYLEPHRINE HYDROCHLORIDE 1 DROP: 2.5 SOLUTION/ DROPS OPHTHALMIC at 16:26

## 2017-10-04 RX ADMIN — PROPOFOL 20 MG: 10 INJECTION, EMULSION INTRAVENOUS at 18:40

## 2017-10-04 RX ADMIN — SODIUM CHLORIDE, POTASSIUM CHLORIDE, SODIUM LACTATE AND CALCIUM CHLORIDE: 600; 310; 30; 20 INJECTION, SOLUTION INTRAVENOUS at 18:40

## 2017-10-04 RX ADMIN — TROPICAMIDE 1 DROP: 10 SOLUTION/ DROPS OPHTHALMIC at 15:59

## 2017-10-04 ASSESSMENT — CONF VISUAL FIELD
OD_NORMAL: 1
OS_NORMAL: 1
METHOD: COUNTING FINGERS

## 2017-10-04 ASSESSMENT — REFRACTION_WEARINGRX
OS_SPHERE: -9.75
OS_CYLINDER: +7.75
OD_ADD: +2.50
OD_CYLINDER: +4.00
OD_AXIS: 005
OS_AXIS: 038
OD_SPHERE: -8.50
OS_ADD: +2.50
SPECS_TYPE: PAL

## 2017-10-04 ASSESSMENT — VISUAL ACUITY
OD_CC: 20/20
OS_CC: 20/40
METHOD: SNELLEN - LINEAR
OS_PH_CC: 20/30

## 2017-10-04 ASSESSMENT — TONOMETRY
OD_IOP_MMHG: 19
IOP_METHOD: TONOPEN
OS_IOP_MMHG: 16

## 2017-10-04 ASSESSMENT — SLIT LAMP EXAM - LIDS
COMMENTS: MILD UL EDEMA
COMMENTS: NORMAL

## 2017-10-04 ASSESSMENT — CUP TO DISC RATIO
OD_RATIO: 0.5
OS_RATIO: 0.4

## 2017-10-04 ASSESSMENT — EXTERNAL EXAM - RIGHT EYE: OD_EXAM: NORMAL

## 2017-10-04 ASSESSMENT — EXTERNAL EXAM - LEFT EYE: OS_EXAM: NORMAL

## 2017-10-04 NOTE — MR AVS SNAPSHOT
After Visit Summary   10/4/2017    Paulino Gomez    MRN: [id number]           Patient Information     Date Of Birth          1965        Visit Information        Provider Department      10/4/2017 7:45 AM Lina Novak MD [facility]        Today's Diagnoses     Extrusion of scleral buckle, initial encounter    -  1    RPE (retinal pigment epithelium) detachment, left        Keratoconus, bilateral        Post corneal transplant        Pseudophakia - Left Eye           Follow-ups after your visit        Follow-up notes from your care team     Return for after surgery.      Your next 10 appointments already scheduled     Oct 04, 2017   Procedure with Lina Novak MD   [facility] (--)    [address]  [facility] [address]   [phone]            Oct 05, 2017  8:45 AM CDT   Post-Op with Lina Novak MD   Eye Clinic ([facility])    [address] [facility]  [address]  [facility] [id number] [phone]            Oct 11, 2017  7:30 AM CDT   Post-Op with Lina Novak MD   Eye Clinic ([facility])    [address]  [address] [facility]  [address] [address]  [facility] [id number] [phone]              Who to contact     Please call your clinic at [phone] to:    Ask questions about your health    Make or cancel appointments    Discuss your medicines    Learn about your test results    Speak to your doctor   If you have compliments or concerns about an experience at your clinic, or if you wish to file a complaint, please contact [facility] Patient Relations at [phone] or email us at [email or web address]         Additional Information About Your Visit        [employer] Information     [employer]Place is an electronic gateway that provides easy, online access to your medical records. With [employer], you can request a clinic appointment, read  your test results, renew a prescription or communicate with your care team.     To sign up for [employer] visit the website at [email or web address]   You will be asked to enter the access code listed below, as well as some personal information. Please follow the directions to create your username and password.     Your access code is: [email or web address]  Expires: 2017  6:31 AM     Your access code will  in 90 days. If you need help or a new code, please contact your [facility] or call [phone] for assistance.        Care EveryWhere ID     This is your Care EveryWhere ID. This could be used by other organizations to access your [place] medical records  [id number]         Blood Pressure from Last 3 Encounters:   17 124/81   17 132/90   14 113/72    Weight from Last 3 Encounters:   17 95.3 kg (210 lb)   17 95.3 kg (210 lb)   14 93 kg (205 lb)              We Performed the Following     OCT Retina Spectralis OU (both eyes)     Dorota-Operative Worksheet        Primary Care Provider Office Phone # Fax #    Reese Moser [phone]       [address] [facility] [address]        Equal Access to Services     JAZZ CORRAL : Hadii aad ku hadasho Soomaali, waaxda luqadaha, qaybta kaalmada adeegyada, waxay cherelle haykatherine castanon . So [facility] [phone].    ATENCIÓN: Si habla español, tiene a lucio disposición servicios gratuitos de asistencia lingüística. Llame al [phone].    We comply with applicable federal civil rights laws and [place] laws. We do not discriminate on the basis of race, color, national origin, age, disability, sex, sexual orientation, or gender identity.            Thank you!     Thank you for choosing EYE CLINIC  for your care. Our goal is always to provide you with excellent care. Hearing back from our patients is one way we can continue to improve our services. Please take a few  minutes to complete the written survey that you may receive in the mail after your visit with us. Thank you!             Your Updated Medication List - Protect others around you: Learn how to safely use, store and throw away your medicines at www.disposemymeds.org.          This list is accurate as of: 10/4/17 11:05 AM.  Always use your most recent med list.                   Brand Name Dispense Instructions for use Diagnosis    aspirin 81 MG tablet      Take 1 tablet by mouth daily.        carboxymethylcellul-glycerin 0.5-0.9 % Soln ophthalmic solution    OPTIVE/REFRESH OPTIVE     1 drop as needed        FISH OIL PO      Take 1 tablet by mouth daily        MULTIVITAMIN PO      Take 1 tablet by mouth daily        ofloxacin 0.3 % ophthalmic solution    OCUFLOX    1 Bottle    Place 1 drop Into the left eye 4 times daily    Extrusion of scleral buckle, initial encounter       oxyCODONE-acetaminophen 5-325 MG per tablet    PERCOCET    20 tablet    Take 1-2 tablets by mouth every 4 hours as needed for moderate to severe pain        prednisoLONE acetate 1 % ophthalmic susp    PRED FORTE    5 mL    Place 1 drop Into the left eye daily    History of penetrating keratoplasty       simvastatin 40 MG tablet    ZOCOR          UNKNOWN TO PATIENT      Take 1 tablet by mouth 3 times daily Kidney stone medication - name unknown        VITAMIN C PO      Take 1 tablet by mouth daily        VITAMIN D (CHOLECALCIFEROL) PO      Take by mouth daily

## 2017-10-04 NOTE — NURSING NOTE
Chief Complaints and History of Present Illnesses   Patient presents with     Consult For     Exposed scleral buckle      HPI    Affected eye(s):  Left   Symptoms:        Frequency:  Constant       Do you have eye pain now?:  No      Comments:  States va is the same since last visit  +red for the past couple weeks  +discharge  Lid is swollen  +floater come and go  Venice Lucas COT 7:48 AM October 4, 2017

## 2017-10-04 NOTE — IP AVS SNAPSHOT
[place] [address]    [place] [address]    Phone:  [phone]                                       After Visit Summary   10/4/2017    Paulino Gomez    MRN: [id number]           After Visit Summary Signature Page     I have received my discharge instructions, and my questions have been answered. I have discussed any challenges I see with this plan with the nurse or doctor.    ..........................................................................................................................................  Patient/Patient Representative Signature      ..........................................................................................................................................  Patient Representative Print Name and Relationship to Patient    ..................................................               ................................................  Date                                            Time    ..........................................................................................................................................  Reviewed by Signature/Title    ...................................................              ..............................................  Date                                                            Time

## 2017-10-04 NOTE — ANESTHESIA PREPROCEDURE EVALUATION
Anesthesia Evaluation     . Pt has had prior anesthetic.     History of anesthetic complications   - PONV        ROS/MED HX    ENT/Pulmonary:      (-) sleep apnea   Neurologic:       Cardiovascular:     (+) Dyslipidemia, ----. : . . . :. .       METS/Exercise Tolerance:     Hematologic:         Musculoskeletal:         GI/Hepatic:        (-) GERD   Renal/Genitourinary:     (+) Nephrolithiasis ,       Endo:         Psychiatric:         Infectious Disease:         Malignancy:         Other:                     Physical Exam  Normal systems: cardiovascular, pulmonary and dental    Airway   Mallampati: II  TM distance: >3 FB  Neck ROM: full    Dental     Cardiovascular       Pulmonary                     Anesthesia Plan      History & Physical Review  History and physical reviewed and following examination; no interval change.    ASA Status:  2 .    NPO Status:  > 8 hours    Plan for MAC Reason for MAC:  Procedure to face, neck, head or breast  PONV prophylaxis:  Ondansetron (or other 5HT-3)  PONV prophylaxis      Postoperative Care  Postoperative pain management:  IV analgesics.      Consents  Anesthetic plan, risks, benefits and alternatives discussed with:  Patient..                          .

## 2017-10-04 NOTE — IP AVS SNAPSHOT
MRN:[id number]                      After Visit Summary   10/4/2017    Paulino Gomez    MRN: [id number]           Thank you!     Thank you for choosing [facility] for your care. Our goal is always to provide you with excellent care. Hearing back from our patients is one way we can continue to improve our services. Please take a few minutes to complete the written survey that you may receive in the mail after you visit with us. Thank you!        Patient Information     Date Of Birth          1965        About your hospital stay     You were admitted on:  October 4, 2017 You last received care in the:  [facility] PACU    You were discharged on:  October 4, 2017       Who to Call     For medical emergencies, please call 911.  For non-urgent questions about your medical care, please call your primary care provider or clinic, [phone]  For questions related to your surgery, please call your surgery clinic        Attending Provider     Provider Specialty    Lina Novak MD Ophthalmology       Primary Care Provider Office Phone # Fax #    Huy Schilling [phone]      After Care Instructions     Activity       Avoid strenuous activities the next several days.            Position       As desired (no restrictions).                  Your next 10 appointments already scheduled     Oct 05, 2017 12:45 PM CDT   Post-Op with Lina Novak MD   Eye Clinic ([facility])    [facility]  [address] [address]   [phone]            Oct 11, 2017  7:30 AM CDT   Post-Op with Lina Novak MD   Eye Clinic ([facility])    [facility]  [address] [address]   [phone]              Further instructions from your care team         Same Day Surgery Discharge Instructions for  Sedation and General Anesthesia       It's not unusual to feel  dizzy, light-headed or faint for up to 24 hours after surgery or while taking pain medication.  If you have these symptoms: sit for a few minutes before standing and have someone assist you when you get up to walk or use the bathroom.      You should rest and relax for the next 24 hours. We recommend you make arrangements to have an adult stay with you for at least 24 hours after your discharge.  Avoid hazardous and strenuous activity.      DO NOT DRIVE any vehicle or operate mechanical equipment for 24 hours following the end of your surgery.  Even though you may feel normal, your reactions may be affected by the medication you have received.      Do not drink alcoholic beverages for 24 hours following surgery.       Slowly progress to your regular diet as you feel able. It's not unusual to feel nauseated and/or vomit after receiving anesthesia.  If you develop these symptoms, drink clear liquids (apple juice, ginger ale, broth, 7-up, etc. ) until you feel better.  If your nausea and vomiting persists for 24 hours, please notify your surgeon.        All narcotic pain medications, along with inactivity and anesthesia, can cause constipation. Drinking plenty of liquids and increasing fiber intake will help.      For any questions of a medical nature, call your surgeon.      Do not make important decisions for 24 hours.      If you had general anesthesia, you may have a sore throat for a couple of days related to the breathing tube used during surgery.  You may use Cepacol lozenges to help with this discomfort.  If it worsens or if you develop a fever, contact your surgeon.       If you feel your pain is not well managed with the pain medications prescribed by your surgeon, please contact your surgeon's office to let them know so they can address your concerns.             [facility]  Discharge Instruction    EyeSurgery [facility]    MD Lina Dunham MD Joseph Terry,  MD  Will I have pain?  Some discomfort is normal and expected following surgery. The first few days after surgery you may need to use prescription pain pills. Taking Tylenol (acetaminophen) regularly may help prevent pain.  Discomfort should gradually decrease and Tylenol should be sufficient to relieve pain. A foreign body sensation in the cornea of the eye is very common and caused by sutures placed at surgery. These sutures will go away in one to two weeks. If the pain worsens, you should call the doctor.  Do I need to wear an eye patch?  You do not need to wear an eye patch at home after the doctor has removed the patch on your first day after surgery. However, you may be more comfortable wearing a patch outside in the sun, when sleeping or napping, or in a anne, windy environment.  How much drainage should I have?  You may expect a moderate amount of drainage for a week. Gradually the drainage should decrease. The lids can be cleaned with a clean washcloth and gentle soap or diluted baby shampoo. Wipe the eyelids gently from the nose outward. Some blood in the tears is a normal finding.  Will there be swelling?  Some swelling is normal for about a week or two after which it will gradually decrease. Applying a cool compress, using a clean washcloth for 5 - 10 minutes several times a day may reduce the swelling and make you more comfortable. People may have some swelling of both eyes, especially if face down positioning is required. The white part of the eye may appear very red or bloody for a week or two. This may get worse a few days after surgery. Though the bright red appearance can look frightening, it is a normal finding early after surgery and will resolve in a few weeks.  Will I need to use eye drops?  You will be using several different kinds of eye drops or ointment (salve) when you leave the hospital. The directions will be on each bottle or tube. The medication with the red top will keep your eye  dilated and may make your eye more sensitive to light. Wearing sunglasses may help. The other medication is a combination antibiotic/steroid to prevent infection and promote healing.  Occasionally a third drop is used to control the pressure in your eye. A new bottle of artificial tears or lubricant ointment may be used along with your prescription eye drops after surgery. You will be using drops from four to eight weeks. Bring all eye medications (drops. ointments, or pills) with you  to each visit.   Always wash your hands before putting in the eye drops. You may wish to have someone else help you. Pull down on the lower lid and squeeze one drop from the bottle being careful not to touch the dropper to your eye or eyelid. One drop is sufficient, but another may be used if the first did not go into the eye. It is often easier to put in the drops if you are reclining or lying down. Wait five (5) minutes after the first drop before using the second drop to allow the medications to absorb into the eye.  How long will it take for my vision to improve?  Your vision should gradually improve, but it may take up to six months to regain your best vision. Frequently, air or gas bubbles are injected into the eye at the time of surgery. This will blur your vision significantly at first. As the bubble becomes smaller it will cause a black line in your vision that moves as you move your head. As the bubble becomes smaller you may notice that it looks more like a bubble or that it will break up into several smaller bubbles. It will take from a few days to a few weeks for the bubble to dissolve and be replaced by clear fluid.  You may notice floaters or double vision after your surgery. These symptoms usually will decrease with time. If the double vision is bothersome patching the eye may help.  If you notice a sudden worsening in your vision call your doctor.  Are there any physical restrictions after surgery?  If an air or gas  bubble was placed in the eye during surgery, you will be asked to spend most of your time (both awake and during the night) with your head in a specific position, frequently face down. As the eye heals and the bubble dissolves there will be less of a need for you to stay in that specific position. You should avoid sleeping on your back until the bubble has totally dissolved and you have been given permission from your surgeon. You should not fly in an airplane or go to high altitudes in the mountains while there is a bubble in your eye. If you should require any other surgery, under general anesthesia, while you still have an air bubble in your eye, have your surgeon or anesthetist contact us prior to your surgery. Some anesthetic agents can make the bubble expand and seriously damage your eye.  Patients that have a gas/air bubble placed in their eye will have a green medical alert band on their wrist.  This band should not be removed until the doctor removes it for you or gives you permission to remove it.     Heavy lifting (greater than 50 pounds), swimming and contact sports should be avoided for about 3 to 4 weeks after surgery.  You may resume your usual sexual activities about one week after surgery.  When may I return to work or my normal activities?  Depending on the type or work, you may return to work within a few days. If your work involves physical activity or driving, you will need to restrict your activities and remain home longer.  You may watch television, look at magazines, or work puzzles. Reading may be uncomfortable for several days, but using the eyes will not cause any damage.  You may go outside as usual. If conditions are windy or anne, wear an eye pad to avoid getting dust or dirt in the eye.  Can I travel?  You cannot fly in an airplane or drive into the mountains as long as the air or gas bubble remains in your eye.    Are there any driving restrictions?  Someone will need to drive you  home from the hospital. Generally driving can be resumed in several days if you have good vision in your other eye. If you do not feel comfortable driving, do not drive! Your depth perception will be decreased so you will want to try driving during the day in light traffic until you feel comfortable driving. You should restrict your driving while you are taking prescription pain pills as they also can affect your judgment.  When can I shower and wash my hair?  You may shower or bathe when you get home, but avoid getting water in your eye. You may want someone to help you shampoo your hair at first.  You may shave, brush your teeth, or comb your hair. Do not use make-up, mascara, or creams/lotions around your eye for several weeks.  When will I see the doctor again?  Generally, you will be seen the first day after surgery and again 1-2 weeks later. If you have not received a return appointment before leaving the hospital, you should call our office during the business hours to arrange an appointment. If you will be seeing your local doctor instead of us, you will need to call that office to set up an appointment.      How do I reach a doctor if I have concerns?  One of our doctors is available by calling [facility] [phone]. Please try to call for routine questions and prescription refills during business hours.    You should call your doctor if:  You notice a sudden decrease in your vision.  Have severe pain or pain increases rather than subsiding.    You notice a new black curtain over your eye that is not the gas bubble. If you have any of these symptoms, you may need to be examined.              Pending Results     Date and Time Order Name Status Description    10/4/2017 1909 Gram stain In process     10/4/2017 1905 Miscellaneous Culture Aerobic Bacterial In process     10/4/2017 1905 Anaerobic bacterial culture In process             Admission Information     Date & Time Provider  "Department Dept. Phone    10/4/2017 Lina Novak MD [facility] [phone]      Your Vitals Were     Blood Pressure Temperature Respirations Height Weight Pulse Oximetry    127/83 98.2  F (36.8  C) (Temporal) 11 1.778 m (5' 10\") 93 kg (205 lb) 95%    BMI (Body Mass Index)                   29.41 kg/m2           [facility] Information     [facility] lets you send messages to your doctor, view your test results, renew your prescriptions, schedule appointments and more. To sign up, go to [email or web address] . Click on \"Log in\" on the left side of the screen, which will take you to the Welcome page. Then click on \"Sign up Now\" on the right side of the page.     You will be asked to enter the access code listed below, as well as some personal information. Please follow the directions to create your username and password.     Your access code is: [email or web address]  Expires: 2017  6:31 AM     Your access code will  in 90 days. If you need help or a new code, please call your [facility] clinic or [phone].        Care EveryWhere ID     This is your Care EveryWhere ID. This could be used by other organizations to access your [facility] medical records  [id number]        Equal Access to Services     JAZZ CORRAL AH: Hadii mina kohler Sobarbara, waaxda luqadaha, qaybta kaalmada cori, gabe clark. So [facility] [phone].    ATENCIÓN: Si habla español, tiene a lucio disposición servicios gratuitos de asistencia lingüística. Jakob al [phone].    We comply with applicable federal civil rights laws and [place] laws. We do not discriminate on the basis of race, color, national origin, age, disability, sex, sexual orientation, or gender identity.               Review of your medicines      UNREVIEWED medicines. Ask your doctor about these medicines        Dose / Directions    cephalexin 750 MG Caps   Used for:  Extrusion of scleral buckle, initial encounter        " Dose:  1 capsule   Take 1 capsule by mouth 3 times daily   Quantity:  40 capsule   Refills:  0         START taking        Dose / Directions    neomycin-polymixin-dexamethasone ophthalmic suspension   Commonly known as:  MAXITROL   Used for:  Extrusion of scleral buckle, initial encounter        Dose:  1 drop   Apply 1 drop to eye 4 times daily Instill into operative eye(s) per physician instructions.   Quantity:  5 mL   Refills:  0         CONTINUE these medicines which have NOT CHANGED        Dose / Directions    aspirin 81 MG tablet        Dose:  1 tablet   Take 1 tablet by mouth daily.   Refills:  0       carboxymethylcellul-glycerin 0.5-0.9 % Soln ophthalmic solution   Commonly known as:  OPTIVE/REFRESH OPTIVE        Dose:  1 drop   1 drop as needed   Refills:  0       FISH OIL PO        Dose:  1 tablet   Take 1 tablet by mouth daily   Refills:  0       MULTIVITAMIN PO        Dose:  1 tablet   Take 1 tablet by mouth daily   Refills:  0       ofloxacin 0.3 % ophthalmic solution   Commonly known as:  OCUFLOX   Used for:  Extrusion of scleral buckle, initial encounter        Dose:  1 drop   Place 1 drop Into the left eye 4 times daily   Quantity:  1 Bottle   Refills:  0       oxyCODONE-acetaminophen 5-325 MG per tablet   Commonly known as:  PERCOCET        Dose:  1-2 tablet   Take 1-2 tablets by mouth every 4 hours as needed for moderate to severe pain   Quantity:  20 tablet   Refills:  0       prednisoLONE acetate 1 % ophthalmic susp   Commonly known as:  PRED FORTE   Used for:  History of penetrating keratoplasty        Dose:  1 drop   Place 1 drop Into the left eye daily   Quantity:  5 mL   Refills:  11       simvastatin 40 MG tablet   Commonly known as:  ZOCOR        Refills:  4       UNKNOWN TO PATIENT        Dose:  1 tablet   Take 1 tablet by mouth 3 times daily Kidney stone medication - name unknown   Refills:  0       VITAMIN C PO        Dose:  1 tablet   Take 1 tablet by mouth daily   Refills:  0        VITAMIN D (CHOLECALCIFEROL) PO        Take by mouth daily   Refills:  0            Where to get your medicines      These medications were sent to [id number]  [place], [place] - [address] AT [facility] [address]  [address] [place] [address]     Phone:  [phone]     cephalexin 750 MG Caps         Some of these will need a paper prescription and others can be bought over the counter. Ask your nurse if you have questions.     Bring a paper prescription for each of these medications     neomycin-polymixin-dexamethasone ophthalmic suspension                Protect others around you: Learn how to safely use, store and throw away your medicines at www.disposemymeds.org.             Medication List: This is a list of all your medications and when to take them. Check marks below indicate your daily home schedule. Keep this list as a reference.      Medications           Morning Afternoon Evening Bedtime As Needed    aspirin 81 MG tablet   Take 1 tablet by mouth daily.                                carboxymethylcellul-glycerin 0.5-0.9 % Soln ophthalmic solution   Commonly known as:  OPTIVE/REFRESH OPTIVE   1 drop as needed                                cephalexin 750 MG Caps   Take 1 capsule by mouth 3 times daily                                FISH OIL PO   Take 1 tablet by mouth daily                                MULTIVITAMIN PO   Take 1 tablet by mouth daily                                neomycin-polymixin-dexamethasone ophthalmic suspension   Commonly known as:  MAXITROL   Apply 1 drop to eye 4 times daily Instill into operative eye(s) per physician instructions.                                ofloxacin 0.3 % ophthalmic solution   Commonly known as:  OCUFLOX   Place 1 drop Into the left eye 4 times daily                                oxyCODONE-acetaminophen 5-325 MG per tablet   Commonly known as:  PERCOCET   Take 1-2 tablets by mouth every 4 hours as needed for moderate to  severe pain                                prednisoLONE acetate 1 % ophthalmic susp   Commonly known as:  PRED FORTE   Place 1 drop Into the left eye daily                                simvastatin 40 MG tablet   Commonly known as:  ZOCOR                                UNKNOWN TO PATIENT   Take 1 tablet by mouth 3 times daily Kidney stone medication - name unknown                                VITAMIN C PO   Take 1 tablet by mouth daily                                VITAMIN D (CHOLECALCIFEROL) PO   Take by mouth daily

## 2017-10-04 NOTE — PROGRESS NOTES
CC: F/u s/p RD repair per Dr cabezas, visual acuity wavy left eye    HPI: 51 yo male s/p PPV/FAx/EL/Gas for recurrent RD (2/13/14) who also has h/o keratoconus OS>>OD.  Reports he had teary discharge that started about 2-3 weeks ago as well as red eye. No change to his vision. He saw Dr Cabezas 1 day ago and found to have an exposed scleral buckle with possible macular edema     OCT: 10-4-17  Right eye- within normal limits. Good foveal contour  Left eye- trace Epiretinal membrane, superotemopral IS/OS dropout, slightly blunted foveal contour    ASSESSMENT/PLAN  Paulino Gomez is a 52 year old male with the following ophthalmologic problems:    0. Exposed scleral buckle, left eye  No signs of infection, but mild eyelid edema; mild conjunctiva hyperemia in the area of exposed scleral buckle   -discuss surgical repair- removal of the scleral buckle left eye; surgical revision and possible Tutoplast patch   Plan for surgical repair today  I reviewed the indications, risks, benefits, and alternatives of the proposed surgical procedure including, but not limited to, failure to improve vision or further loss of vision,  and need for additional surgery, bleeding, infection, loss of vision and the remote possibility of complications of anesthesia. 1:1000 risk of infection/bleed/loss of eye; 1:100 risk of RD and need for further surgery. Patient aware of prolonged healing after retinal surgery as well as possibility of a gas/SO  instillation into eye. Patient agreed to proceed with surgery.  I provided multiple opportunities for the questions, answered all questions to the best of my ability, and confirmed that my answers and my discussion were understood.     1. history of retinal detachment, partial, with single defect, left   -  s/p Pars plana vitrectomy (PPV)/Afx/EL/gas for recurrent RD  (2/13/14)  -S/p SB/cryo for Retinal detachment  1/2014  - looks good, retina flat    2. Keratoconus both eyes   -s/p penetrating keratoplasty  (PK) left eye (12/2015)  - followed by cornea    3. 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  I personally obtained the chief complaint(s) and history of present illness.  I confirmed and edited as necessary the review of systems, past medical/surgical history, family history, social history, and examination findings as documented by others; and I examined the patient myself.  I personally reviewed the relevant tests, images, and reports as documented above.  I formulated and edited as necessary the assessment and plan and discussed the findings and management plan with the patient and family    Lina Novak MD  .  Retina Service   Department of Ophthalmology and Visual Neurosciences   [facility]  Phone: [phone]   Fax: [phone]

## 2017-10-05 ENCOUNTER — OFFICE VISIT (OUTPATIENT)
Dept: OPHTHALMOLOGY | Facility: CLINIC | Age: 52
End: 2017-10-05
Attending: OPHTHALMOLOGY
Payer: COMMERCIAL

## 2017-10-05 DIAGNOSIS — Z48.810 AFTERCARE FOLLOWING SURGERY OF A SENSORY ORGAN: Primary | ICD-10-CM

## 2017-10-05 PROCEDURE — 99213 OFFICE O/P EST LOW 20 MIN: CPT | Mod: ZF

## 2017-10-05 ASSESSMENT — SLIT LAMP EXAM - LIDS
COMMENTS: NORMAL
COMMENTS: MILD UL EDEMA

## 2017-10-05 ASSESSMENT — TONOMETRY
IOP_METHOD: ICARE
OS_IOP_MMHG: 16
OD_IOP_MMHG: 20

## 2017-10-05 ASSESSMENT — REFRACTION_WEARINGRX
OD_SPHERE: -8.50
OS_CYLINDER: +7.75
OS_SPHERE: -9.75
OD_CYLINDER: +4.00
OS_ADD: +2.50
OD_ADD: +2.50
OS_AXIS: 038
OD_AXIS: 005
SPECS_TYPE: PAL

## 2017-10-05 ASSESSMENT — EXTERNAL EXAM - RIGHT EYE: OD_EXAM: NORMAL

## 2017-10-05 ASSESSMENT — VISUAL ACUITY
OS_CC+: -2
OD_CC: 20/20
OS_CC: 20/60
OS_PH_CC: 20/40-1
METHOD: SNELLEN - LINEAR

## 2017-10-05 ASSESSMENT — CONF VISUAL FIELD
OS_NORMAL: 1
METHOD: COUNTING FINGERS
OD_NORMAL: 1

## 2017-10-05 ASSESSMENT — EXTERNAL EXAM - LEFT EYE: OS_EXAM: NORMAL

## 2017-10-05 ASSESSMENT — CUP TO DISC RATIO
OS_RATIO: 0.4
OD_RATIO: 0.5

## 2017-10-05 NOTE — PATIENT INSTRUCTIONS
Tobradex 4 x daily  Atropine 1 x daily  Ointment at bedtime til gone     POSITIONING:    One of the most important parts of post-operative management involves the head position.  If a Bubble of Air, Gas or Silicone Oil was placed in the eye during the surgery, specific positioning will be required.  This position should be maintained for as much of the time as possible post-operatively, especially during the first seven days after surgery.  The bubble is similar to a cast placed on a broken arm.  It helps to hold the retina in place while the retina heals.  Gas or air bubbles will be reabsorbed by the body while silicone oil needs to be removed during a second surgery.  You will not be able to see through the bubble or oil.      Your doctor will determine the correct positioning after surgery.  Renting a chair for position might be indicated.        SHIELD OR GLASSES:     For two weeks after surgery, a pair of glasses (any type) must be worn at all times for protection.  If glasses are not worn, a metal or hard plastic shield should be used to cover the eye.  This ensures that the eye does not get  bumped  accidentally, especially while sleeping.  After 14 days, this is no longer required unless specified.  Some mucous or blood-tinged discharge is common.  If shayna pus is noted, notify the doctor.      PAIN:    Postoperatively, the pain is greatest within the first 24 hours and then subsides slowly over the following several days.  If the pain becomes more intense, one should seek medical attention.  Extra-strength Tylenol (acetaminophen) is usually all that is needed.  It is likely that the eye will remain red for up to one month after surgery.  This is not unusual.      ACTIVITIES:     If a Gas or Air Bubble was placed in the eye, DO NOT FLY IN AN AIRPLANE FOR ANY REASON.  DO NOT SCUBA DIVE, GO INTO HIGH ALTITUDES OR UNDERGO OTHER ELECTIVE SURGERY!  For the first week, DO NOT engage in vigorous activities such as  running, jumping or bouncing-type activities.  Position is important if required.  Lifting is restricted to 20 pounds.  Avoid anne or dirty environments.  Sexual activity is restricted for one week.  Washing your hair and bathing is ok.  Avoid water to the eye.  No pool or lake swimming for three weeks postoperatively.  No yard work for three weeks postoperatively.

## 2017-10-05 NOTE — PROGRESS NOTES
Postoperative day 1 status post scleral buckle -element removal left eye     Slept well  Retina attached  Doing well    Plan:  Dempsey shield at all times  Retina detachment and endophthalmitis precautions were discussed with the patient and was asked to return if any of the those occur    Medications to operative eye  Ofloxacin four times a day  Left eye   Predforte  Three times a day left eye   Maxitrol oint at bedtime    Follow up in one week  ~~~~~~~~~~~~~~~~~~~~~~~~~~~~~~~~~~   Complete documentation of historical and exam elements from today's encounter can be found in the full encounter summary report (not reduplicated in this progress note).  I personally obtained the chief complaint(s) and history of present illness.  I confirmed and edited as necessary the review of systems, past medical/surgical history, family history, social history, and examination findings as documented by others; and I examined the patient myself.  I personally reviewed the relevant tests, images, and reports as documented above.  I formulated and edited as necessary the assessment and plan and discussed the findings and management plan with the patient and family    Lina Novak MD  .  Retina Service   Department of Ophthalmology and Visual Neurosciences   [facility]  Phone: [phone]   Fax: [phone]

## 2017-10-05 NOTE — OP NOTE
SURGEON: BLUE FLORES MD  Assistant: Lazaro Ledesma MD  PREOPERATIVE DIAGNOSIS:   1. Extrusion of scleral buckle left eye  POSTOPERATIVE DIAGNOSIS: same   NAME OF THE PROCEDURE:   1- removal of extruded scleral buckle LEFT eye  ANESTHESIA: Mac with retrobulbar    COMPLICATIONS: none   INDICATIONS: Paulino Gomez is a 52 year old male with extrusion of scleral buckle here for repair.    DESCRIPTION OF THE PROCEDURE   The patient was brought into the OR where macula anesthesia was given by the anesthesia department. A peribulbar block consistent of a 1:1 mixtures of 2% Lidocaine and 0.75 % of marcaine with epinephrine and wydase was administered.    The left eye was then prepped and draped in the usual fashion for ophthalmic surgery, including the installation of one drop of 5% povidone iodine.  The supratemporal conjunctiva was explored and a conjunctival opening was noted. The scleral buckle element was visualized through the conjunctival defect and was gently removed without significant resistant. The area was explored and no encircling band was noted. There was moderate granulation tissue overlying the sclera but no abscess or shayna infection. The supratemporal conjunctiva was irrigated using polymyxin antibiotics and was closed using three 6-0 plain sutures. Subconjunctival injections of decadron and ancef were placed followed by subtenon injection of kenalog.    The lid speculum was removed. The eye was cleaned with wet and dry gauze. Maxitrol ointment was placed in the eye. An eye pad and ruggiero shield were taped over the eye.    The patient tolerated well the procedure and was discharge to the post-operative unit in stable conditions with no complications.  The surgery was assisted by Dr. Lazaro Ledesma, because no qualified resident was available on the day of the surgery. Dr. Lazaro Ledesma assisted with removal of the buckle component. I was present for the entire surgery.

## 2017-10-05 NOTE — NURSING NOTE
Chief Complaints and History of Present Illnesses   Patient presents with     Post Op (Ophthalmology) Left Eye      1 day after scleral buckle     HPI    Last Eye Exam:  10/4/17   Affected eye(s):  Left   Symptoms:        Duration:  1 day   Frequency:  Constant       Do you have eye pain now?:  No      Comments:  He is here today one day post op for scleral buckle LE.   He states he slept fine last night, but had a little discomfort upon waking so he took Tylenol   He notices a difference in colors from one eye to the other.  He denies flashes or floaters.    Ryan Mary COT 12:48 PM October 5, 2017

## 2017-10-05 NOTE — DISCHARGE INSTRUCTIONS
Same Day Surgery Discharge Instructions for  Sedation and General Anesthesia       It's not unusual to feel dizzy, light-headed or faint for up to 24 hours after surgery or while taking pain medication.  If you have these symptoms: sit for a few minutes before standing and have someone assist you when you get up to walk or use the bathroom.      You should rest and relax for the next 24 hours. We recommend you make arrangements to have an adult stay with you for at least 24 hours after your discharge.  Avoid hazardous and strenuous activity.      DO NOT DRIVE any vehicle or operate mechanical equipment for 24 hours following the end of your surgery.  Even though you may feel normal, your reactions may be affected by the medication you have received.      Do not drink alcoholic beverages for 24 hours following surgery.       Slowly progress to your regular diet as you feel able. It's not unusual to feel nauseated and/or vomit after receiving anesthesia.  If you develop these symptoms, drink clear liquids (apple juice, ginger ale, broth, 7-up, etc. ) until you feel better.  If your nausea and vomiting persists for 24 hours, please notify your surgeon.        All narcotic pain medications, along with inactivity and anesthesia, can cause constipation. Drinking plenty of liquids and increasing fiber intake will help.      For any questions of a medical nature, call your surgeon.      Do not make important decisions for 24 hours.      If you had general anesthesia, you may have a sore throat for a couple of days related to the breathing tube used during surgery.  You may use Cepacol lozenges to help with this discomfort.  If it worsens or if you develop a fever, contact your surgeon.       If you feel your pain is not well managed with the pain medications prescribed by your surgeon, please contact your surgeon's office to let them know so they can address your concerns.             Nik Ozuna  Hospital  Discharge Instruction    EyeSurgery [facility]    MD Lina Dunham MD Joseph Terry, MD  Will I have pain?  Some discomfort is normal and expected following surgery. The first few days after surgery you may need to use prescription pain pills. Taking Tylenol (acetaminophen) regularly may help prevent pain.  Discomfort should gradually decrease and Tylenol should be sufficient to relieve pain. A foreign body sensation in the cornea of the eye is very common and caused by sutures placed at surgery. These sutures will go away in one to two weeks. If the pain worsens, you should call the doctor.  Do I need to wear an eye patch?  You do not need to wear an eye patch at home after the doctor has removed the patch on your first day after surgery. However, you may be more comfortable wearing a patch outside in the sun, when sleeping or napping, or in a anne, windy environment.  How much drainage should I have?  You may expect a moderate amount of drainage for a week. Gradually the drainage should decrease. The lids can be cleaned with a clean washcloth and gentle soap or diluted baby shampoo. Wipe the eyelids gently from the nose outward. Some blood in the tears is a normal finding.  Will there be swelling?  Some swelling is normal for about a week or two after which it will gradually decrease. Applying a cool compress, using a clean washcloth for 5 - 10 minutes several times a day may reduce the swelling and make you more comfortable. People may have some swelling of both eyes, especially if face down positioning is required. The white part of the eye may appear very red or bloody for a week or two. This may get worse a few days after surgery. Though the bright red appearance can look frightening, it is a normal finding early after surgery and will resolve in a few weeks.  Will I need to use eye drops?  You will be using several different kinds of eye drops or ointment (salve)  when you leave the hospital. The directions will be on each bottle or tube. The medication with the red top will keep your eye dilated and may make your eye more sensitive to light. Wearing sunglasses may help. The other medication is a combination antibiotic/steroid to prevent infection and promote healing.  Occasionally a third drop is used to control the pressure in your eye. A new bottle of artificial tears or lubricant ointment may be used along with your prescription eye drops after surgery. You will be using drops from four to eight weeks. Bring all eye medications (drops. ointments, or pills) with you  to each visit.   Always wash your hands before putting in the eye drops. You may wish to have someone else help you. Pull down on the lower lid and squeeze one drop from the bottle being careful not to touch the dropper to your eye or eyelid. One drop is sufficient, but another may be used if the first did not go into the eye. It is often easier to put in the drops if you are reclining or lying down. Wait five (5) minutes after the first drop before using the second drop to allow the medications to absorb into the eye.  How long will it take for my vision to improve?  Your vision should gradually improve, but it may take up to six months to regain your best vision. Frequently, air or gas bubbles are injected into the eye at the time of surgery. This will blur your vision significantly at first. As the bubble becomes smaller it will cause a black line in your vision that moves as you move your head. As the bubble becomes smaller you may notice that it looks more like a bubble or that it will break up into several smaller bubbles. It will take from a few days to a few weeks for the bubble to dissolve and be replaced by clear fluid.  You may notice floaters or double vision after your surgery. These symptoms usually will decrease with time. If the double vision is bothersome patching the eye may help.  If you  notice a sudden worsening in your vision call your doctor.  Are there any physical restrictions after surgery?  If an air or gas bubble was placed in the eye during surgery, you will be asked to spend most of your time (both awake and during the night) with your head in a specific position, frequently face down. As the eye heals and the bubble dissolves there will be less of a need for you to stay in that specific position. You should avoid sleeping on your back until the bubble has totally dissolved and you have been given permission from your surgeon. You should not fly in an airplane or go to high altitudes in the mountains while there is a bubble in your eye. If you should require any other surgery, under general anesthesia, while you still have an air bubble in your eye, have your surgeon or anesthetist contact us prior to your surgery. Some anesthetic agents can make the bubble expand and seriously damage your eye.  Patients that have a gas/air bubble placed in their eye will have a green medical alert band on their wrist.  This band should not be removed until the doctor removes it for you or gives you permission to remove it.     Heavy lifting (greater than 50 pounds), swimming and contact sports should be avoided for about 3 to 4 weeks after surgery.  You may resume your usual sexual activities about one week after surgery.  When may I return to work or my normal activities?  Depending on the type or work, you may return to work within a few days. If your work involves physical activity or driving, you will need to restrict your activities and remain home longer.  You may watch television, look at magazines, or work puzzles. Reading may be uncomfortable for several days, but using the eyes will not cause any damage.  You may go outside as usual. If conditions are windy or anne, wear an eye pad to avoid getting dust or dirt in the eye.  Can I travel?  You cannot fly in an airplane or drive into the  mountains as long as the air or gas bubble remains in your eye.    Are there any driving restrictions?  Someone will need to drive you home from the hospital. Generally driving can be resumed in several days if you have good vision in your other eye. If you do not feel comfortable driving, do not drive! Your depth perception will be decreased so you will want to try driving during the day in light traffic until you feel comfortable driving. You should restrict your driving while you are taking prescription pain pills as they also can affect your judgment.  When can I shower and wash my hair?  You may shower or bathe when you get home, but avoid getting water in your eye. You may want someone to help you shampoo your hair at first.  You may shave, brush your teeth, or comb your hair. Do not use make-up, mascara, or creams/lotions around your eye for several weeks.  When will I see the doctor again?  Generally, you will be seen the first day after surgery and again 1-2 weeks later. If you have not received a return appointment before leaving the hospital, you should call our office during the business hours to arrange an appointment. If you will be seeing your local doctor instead of us, you will need to call that office to set up an appointment.      How do I reach a doctor if I have concerns?  One of our doctors is available by calling [facility] [phone]. Please try to call for routine questions and prescription refills during business hours.    You should call your doctor if:  You notice a sudden decrease in your vision.  Have severe pain or pain increases rather than subsiding.    You notice a new black curtain over your eye that is not the gas bubble. If you have any of these symptoms, you may need to be examined.

## 2017-10-05 NOTE — ANESTHESIA CARE TRANSFER NOTE
Patient: Paulino Gomez    Procedure(s):  LEFT EYE EXPLANTATION OF SCLERAL BUCKLE - Wound Class: I-Clean    Diagnosis: exposed scleral buckle left eye   Diagnosis Additional Information: No value filed.    Anesthesia Type:   MAC     Note:  Airway :Room Air  Patient transferred to:Phase II        Vitals: (Last set prior to Anesthesia Care Transfer)    CRNA VITALS  10/4/2017 1850 - 10/4/2017 1920      10/4/2017             Pulse: 50    SpO2: 98 %    Resp Rate (set): 10                Electronically Signed By: DEO Rausch CRNA  October 4, 2017  7:20 PM

## 2017-10-05 NOTE — MR AVS SNAPSHOT
After Visit Summary   10/5/2017    Paulino Gomez    MRN: [id number]           Patient Information     Date Of Birth          1965        Visit Information        Provider Department      10/5/2017 12:45 PM Lina Novak MD [facility]        Today's Diagnoses     Aftercare following surgery of a sensory organ    -  1      Care Instructions    Tobradex 4 x daily  Atropine 1 x daily  Ointment at bedtime til gone     POSITIONING:    One of the most important parts of post-operative management involves the head position.  If a Bubble of Air, Gas or Silicone Oil was placed in the eye during the surgery, specific positioning will be required.  This position should be maintained for as much of the time as possible post-operatively, especially during the first seven days after surgery.  The bubble is similar to a cast placed on a broken arm.  It helps to hold the retina in place while the retina heals.  Gas or air bubbles will be reabsorbed by the body while silicone oil needs to be removed during a second surgery.  You will not be able to see through the bubble or oil.      Your doctor will determine the correct positioning after surgery.  Renting a chair for position might be indicated.        SHIELD OR GLASSES:     For two weeks after surgery, a pair of glasses (any type) must be worn at all times for protection.  If glasses are not worn, a metal or hard plastic shield should be used to cover the eye.  This ensures that the eye does not get  bumped  accidentally, especially while sleeping.  After 14 days, this is no longer required unless specified.  Some mucous or blood-tinged discharge is common.  If shayna pus is noted, notify the doctor.      PAIN:    Postoperatively, the pain is greatest within the first 24 hours and then subsides slowly over the following several days.  If the pain becomes more intense, one should seek medical attention.  Extra-strength Tylenol (acetaminophen) is  usually all that is needed.  It is likely that the eye will remain red for up to one month after surgery.  This is not unusual.      ACTIVITIES:     If a Gas or Air Bubble was placed in the eye, DO NOT FLY IN AN AIRPLANE FOR ANY REASON.  DO NOT SCUBA DIVE, GO INTO HIGH ALTITUDES OR UNDERGO OTHER ELECTIVE SURGERY!  For the first week, DO NOT engage in vigorous activities such as running, jumping or bouncing-type activities.  Position is important if required.  Lifting is restricted to 20 pounds.  Avoid anne or dirty environments.  Sexual activity is restricted for one week.  Washing your hair and bathing is ok.  Avoid water to the eye.  No pool or lake swimming for three weeks postoperatively.  No yard work for three weeks postoperatively.              Follow-ups after your visit        Follow-up notes from your care team     Return in about 1 week (around 10/12/2017).      Your next 10 appointments already scheduled     Oct 11, 2017  7:30 AM CDT   Post-Op with Lina Novak MD   Eye Clinic ([facility])    [facility]  [address]  [address]  [place] [facility] [address]   [phone]              Who to contact     Please call your clinic at [phone] to:    Ask questions about your health    Make or cancel appointments    Discuss your medicines    Learn about your test results    Speak to your doctor   If you have compliments or concerns about an experience at your clinic, or if you wish to file a complaint, please contact [facility] Patient Relations at [phone] or email us at [email or web address]         Additional Information About Your Visit        e(ye)BRAIN Information     e(ye)BRAIN is an electronic gateway that provides easy, online access to your medical records. With e(ye)BRAIN, you can request a clinic appointment, read your test results, renew a prescription or communicate with your care team.     To sign up for e(ye)BRAIN visit the  website at [email or web address]   You will be asked to enter the access code listed below, as well as some personal information. Please follow the directions to create your username and password.     Your access code is: [email or web address]  Expires: 2017  6:31 AM     Your access code will  in 90 days. If you need help or a new code, please contact your [facility] or call [phone] for assistance.        Care EveryWhere ID     This is your Care EveryWhere ID. This could be used by other organizations to access your [place] medical records  [id number]         Blood Pressure from Last 3 Encounters:   10/04/17 (!) 123/92   17 124/81   17 132/90    Weight from Last 3 Encounters:   10/04/17 93 kg (205 lb)   17 95.3 kg (210 lb)   17 95.3 kg (210 lb)              Today, you had the following     No orders found for display       Primary Care Provider Office Phone # Fax #    Huy Schilling [phone]       [address]  [facility] [address]        Equal Access to Services     JAZZ CORRAL : Hadii mina little hadasho Soomaali, waaxda luqadaha, qaybta kaalmada adeegyada, gabe clark. So [facility] [phone].    ATENCIÓN: Si habla español, tiene a lucio disposición servicios gratuitos de asistencia lingüística. Llame al [phone].    We comply with applicable federal civil rights laws and [place] laws. We do not discriminate on the basis of race, color, national origin, age, disability, sex, sexual orientation, or gender identity.            Thank you!     Thank you for choosing EYE CLINIC  for your care. Our goal is always to provide you with excellent care. Hearing back from our patients is one way we can continue to improve our services. Please take a few minutes to complete the written survey that you may receive in the mail after your visit with us. Thank you!             Your Updated  Medication List - Protect others around you: Learn how to safely use, store and throw away your medicines at www.disposemymeds.org.          This list is accurate as of: 10/5/17  1:33 PM.  Always use your most recent med list.                   Brand Name Dispense Instructions for use Diagnosis    aspirin 81 MG tablet      Take 1 tablet by mouth daily.        carboxymethylcellul-glycerin 0.5-0.9 % Soln ophthalmic solution    OPTIVE/REFRESH OPTIVE     1 drop as needed        cephalexin 750 MG Caps     40 capsule    Take 1 capsule by mouth 3 times daily    Extrusion of scleral buckle, initial encounter       FISH OIL PO      Take 1 tablet by mouth daily        MULTIVITAMIN PO      Take 1 tablet by mouth daily        neomycin-polymixin-dexamethasone ophthalmic suspension    MAXITROL    5 mL    Apply 1 drop to eye 4 times daily Instill into operative eye(s) per physician instructions.    Extrusion of scleral buckle, initial encounter       ofloxacin 0.3 % ophthalmic solution    OCUFLOX    1 Bottle    Place 1 drop Into the left eye 4 times daily    Extrusion of scleral buckle, initial encounter       oxyCODONE-acetaminophen 5-325 MG per tablet    PERCOCET    20 tablet    Take 1-2 tablets by mouth every 4 hours as needed for moderate to severe pain        prednisoLONE acetate 1 % ophthalmic susp    PRED FORTE    5 mL    Place 1 drop Into the left eye daily    History of penetrating keratoplasty       simvastatin 40 MG tablet    ZOCOR          UNKNOWN TO PATIENT      Take 1 tablet by mouth 3 times daily Kidney stone medication - name unknown        VITAMIN C PO      Take 1 tablet by mouth daily        VITAMIN D (CHOLECALCIFEROL) PO      Take by mouth daily

## 2017-10-08 LAB
BACTERIA SPEC CULT: ABNORMAL
BACTERIA SPEC CULT: ABNORMAL
SPECIMEN SOURCE: ABNORMAL

## 2017-10-11 ENCOUNTER — OFFICE VISIT (OUTPATIENT)
Dept: OPHTHALMOLOGY | Facility: CLINIC | Age: 52
End: 2017-10-11
Attending: OPHTHALMOLOGY
Payer: COMMERCIAL

## 2017-10-11 DIAGNOSIS — Z48.810 AFTERCARE FOLLOWING SURGERY OF A SENSORY ORGAN: ICD-10-CM

## 2017-10-11 DIAGNOSIS — Z94.7 S/P PKP (PENETRATING KERATOPLASTY): ICD-10-CM

## 2017-10-11 DIAGNOSIS — T85.398D EXTRUSION OF SCLERAL BUCKLE, SUBSEQUENT ENCOUNTER: Primary | ICD-10-CM

## 2017-10-11 LAB
BACTERIA SPEC CULT: NORMAL
Lab: NORMAL
SPECIMEN SOURCE: NORMAL

## 2017-10-11 PROCEDURE — 99212 OFFICE O/P EST SF 10 MIN: CPT | Mod: ZF

## 2017-10-11 RX ORDER — PREDNISOLONE ACETATE 10 MG/ML
1 SUSPENSION/ DROPS OPHTHALMIC 3 TIMES DAILY
COMMUNITY
End: 2018-07-17

## 2017-10-11 ASSESSMENT — TONOMETRY
IOP_METHOD: TONOPEN
OD_IOP_MMHG: 24
OS_IOP_MMHG: 18
IOP_METHOD: TONOPEN
OD_IOP_MMHG: 21
OS_IOP_MMHG: 25

## 2017-10-11 ASSESSMENT — VISUAL ACUITY
METHOD: SNELLEN - LINEAR
OS_CC: 20/50
OS_CC+: -2
OD_CC: 20/20
OD_CC+: -1
CORRECTION_TYPE: GLASSES

## 2017-10-11 ASSESSMENT — SLIT LAMP EXAM - LIDS
COMMENTS: NORMAL
COMMENTS: MILD UL EDEMA

## 2017-10-11 ASSESSMENT — EXTERNAL EXAM - LEFT EYE: OS_EXAM: NORMAL

## 2017-10-11 ASSESSMENT — CUP TO DISC RATIO: OS_RATIO: 0.4

## 2017-10-11 ASSESSMENT — EXTERNAL EXAM - RIGHT EYE: OD_EXAM: NORMAL

## 2017-10-11 NOTE — NURSING NOTE
Chief Complaints and History of Present Illnesses   Patient presents with     Post Op (Ophthalmology) Left Eye     HPI    Symptoms:           Do you have eye pain now?:  Yes   Location:  OS   Pain Frequency:  Intermittent   Pain Characteristics:  Aching      Comments:  POP left eye.      The patient notices intermittent pressure in the left eye.  The patient notes that he has a lot of fatigue this week.    FELTON Christianson 7:15 AM 10/11/2017

## 2017-10-11 NOTE — PROGRESS NOTES
Postoperative week 1 status post scleral buckle -element removal left eye     Feels fatigued this week   Retina attached  Doing well    Plan:  Dempsey shield at all times  Retina detachment and endophthalmitis precautions were discussed with the patient and was asked to return if any of the those occur    Medications to operative eye  Ofloxacin three times a day Left eye   Predforte  Twice a day   Maxitrol oint at bedtime    Follow up in 3 weeks with prescription   ~~~~~~~~~~~~~~~~~~~~~~~~~~~~~~~~~~   Complete documentation of historical and exam elements from today's encounter can be found in the full encounter summary report (not reduplicated in this progress note).  I personally obtained the chief complaint(s) and history of present illness.  I confirmed and edited as necessary the review of systems, past medical/surgical history, family history, social history, and examination findings as documented by others; and I examined the patient myself.  I personally reviewed the relevant tests, images, and reports as documented above.  I formulated and edited as necessary the assessment and plan and discussed the findings and management plan with the patient and family    Lina Novak MD  .  Retina Service   Department of Ophthalmology and Visual Neurosciences   [facility]  Phone: [phone]   Fax: [phone]            taper as directed in right eye. Start immediately after surgery in left eye and continue as directed.

## 2017-10-11 NOTE — PATIENT INSTRUCTIONS
Medications to operative eye  Ofloxacin three times a day Left eye until finish  Predforte  Twice a day x 1 week then once a day x 1 week  Maxitrol oint at bedtime until finish     Follow up in 3 weeks

## 2017-10-11 NOTE — MR AVS SNAPSHOT
After Visit Summary   10/11/2017    Paulino Gomez    MRN: [id number]           Patient Information     Date Of Birth          1965        Visit Information        Provider Department      10/11/2017 7:30 AM Lina Novak MD [facility]        Today's Diagnoses     Extrusion of scleral buckle, subsequent encounter    -  1    S/P PKP (penetrating keratoplasty)        Aftercare following surgery of a sensory organ          Care Instructions    Medications to operative eye  Ofloxacin three times a day Left eye until finish  Predforte  Twice a day x 1 week then once a day x 1 week  Maxitrol oint at bedtime until finish     Follow up in 3 weeks           Follow-ups after your visit        Follow-up notes from your care team     Return in about 1 week (around 10/18/2017) for postop scleral buckle removal.      Your next 10 appointments already scheduled     Nov 02, 2017  7:30 AM CDT   Post-Op with Lina Novak MD   Eye Clinic ([facility])    [address]  [address]  [address]  [place] [address]   [phone]              Who to contact     Please call your clinic at [phone] to:    Ask questions about your health    Make or cancel appointments    Discuss your medicines    Learn about your test results    Speak to your doctor   If you have compliments or concerns about an experience at your clinic, or if you wish to file a complaint, please contact [facility] Patient Relations at [phone] or email us at [email or web address]         Additional Information About Your Visit        MyChart Information     Startupi is an electronic gateway that provides easy, online access to your medical records. With Startupi, you can request a clinic appointment, read your test results, renew a prescription or communicate with your care team.     To sign up for [employer] visit the website at [email or web address]    You will be asked to enter the access code listed below, as well as some personal information. Please follow the directions to create your username and password.     Your access code is: [email or web address]  Expires: 2017  6:31 AM     Your access code will  in 90 days. If you need help or a new code, please contact your [facility] or call [phone] for assistance.        Care EveryWhere ID     This is your Care EveryWhere ID. This could be used by other organizations to access your [place] medical records  [id number]         Blood Pressure from Last 3 Encounters:   10/04/17 (!) 123/92   17 124/81   17 132/90    Weight from Last 3 Encounters:   10/04/17 93 kg (205 lb)   17 95.3 kg (210 lb)   17 95.3 kg (210 lb)              Today, you had the following     No orders found for display       Primary Care Provider Office Phone # Fax #    Huy Schilling [phone]       [facility] [address]  [place] [address]        Equal Access to Services     [facility]: Hadii aad ku hadasho Sobarbara, waaxda luqadaha, qaybta kaalmada adeegyada, gabe castanon . So [facility] [phone].    ATENCIÓN: Si habla español, tiene a lucio disposición servicios gratuitos de asistencia lingüística. Llame al [phone].    We comply with applicable federal civil rights laws and [place] laws. We do not discriminate on the basis of race, color, national origin, age, disability, sex, sexual orientation, or gender identity.            Thank you!     Thank you for choosing EYE CLINIC  for your care. Our goal is always to provide you with excellent care. Hearing back from our patients is one way we can continue to improve our services. Please take a few minutes to complete the written survey that you may receive in the mail after your visit with us. Thank you!             Your Updated Medication List - Protect others around you:  Learn how to safely use, store and throw away your medicines at www.disposemymeds.org.          This list is accurate as of: 10/11/17  8:38 AM.  Always use your most recent med list.                   Brand Name Dispense Instructions for use Diagnosis    aspirin 81 MG tablet      Take 1 tablet by mouth daily.        carboxymethylcellul-glycerin 0.5-0.9 % Soln ophthalmic solution    OPTIVE/REFRESH OPTIVE     1 drop as needed        cephalexin 750 MG Caps     40 capsule    Take 1 capsule by mouth 3 times daily    Extrusion of scleral buckle, initial encounter       FISH OIL PO      Take 1 tablet by mouth daily        MULTIVITAMIN PO      Take 1 tablet by mouth daily        neomycin-polymixin-dexamethasone ophthalmic suspension    MAXITROL    5 mL    Apply 1 drop to eye 4 times daily Instill into operative eye(s) per physician instructions.    Extrusion of scleral buckle, initial encounter       ofloxacin 0.3 % ophthalmic solution    OCUFLOX    1 Bottle    Place 1 drop Into the left eye 4 times daily    Extrusion of scleral buckle, initial encounter       oxyCODONE-acetaminophen 5-325 MG per tablet    PERCOCET    20 tablet    Take 1-2 tablets by mouth every 4 hours as needed for moderate to severe pain        * prednisoLONE acetate 1 % ophthalmic susp    PRED FORTE     Place 1 drop Into the left eye 3 times daily        * prednisoLONE acetate 1 % ophthalmic susp    PRED FORTE    5 mL    Place 1 drop Into the left eye daily    History of penetrating keratoplasty       simvastatin 40 MG tablet    ZOCOR          UNKNOWN TO PATIENT      Take 1 tablet by mouth 3 times daily Kidney stone medication - name unknown        VITAMIN C PO      Take 1 tablet by mouth daily        VITAMIN D (CHOLECALCIFEROL) PO      Take by mouth daily        * Notice:  This list has 2 medication(s) that are the same as other medications prescribed for you. Read the directions carefully, and ask your doctor or other care provider to review them with  you.

## 2017-11-02 ENCOUNTER — OFFICE VISIT (OUTPATIENT)
Dept: OPHTHALMOLOGY | Facility: CLINIC | Age: 52
End: 2017-11-02
Attending: OPHTHALMOLOGY
Payer: COMMERCIAL

## 2017-11-02 DIAGNOSIS — Z48.810 AFTERCARE FOLLOWING SURGERY OF A SENSORY ORGAN: Primary | ICD-10-CM

## 2017-11-02 PROCEDURE — 99213 OFFICE O/P EST LOW 20 MIN: CPT | Mod: ZF

## 2017-11-02 ASSESSMENT — CONF VISUAL FIELD
OD_NORMAL: 1
OS_NORMAL: 1
METHOD: COUNTING FINGERS

## 2017-11-02 ASSESSMENT — REFRACTION_WEARINGRX
OS_ADD: +2.50
OS_CYLINDER: +7.75
OD_CYLINDER: +4.00
SPECS_TYPE: PAL
OD_AXIS: 005
OD_ADD: +2.50
OD_SPHERE: -8.50
OS_AXIS: 038
OS_SPHERE: -9.75

## 2017-11-02 ASSESSMENT — SLIT LAMP EXAM - LIDS: COMMENTS: NORMAL

## 2017-11-02 ASSESSMENT — EXTERNAL EXAM - RIGHT EYE: OD_EXAM: NORMAL

## 2017-11-02 ASSESSMENT — VISUAL ACUITY
OD_CC: 20/20
METHOD: SNELLEN - LINEAR
CORRECTION_TYPE: GLASSES
OS_CC+: -1
OS_PH_CC: 20/40-1
OS_CC: 20/50

## 2017-11-02 ASSESSMENT — TONOMETRY
OD_IOP_MMHG: 18
OS_IOP_MMHG: 21
IOP_METHOD: TONOPEN

## 2017-11-02 ASSESSMENT — CUP TO DISC RATIO: OS_RATIO: 0.4

## 2017-11-02 ASSESSMENT — EXTERNAL EXAM - LEFT EYE: OS_EXAM: NORMAL

## 2017-11-02 NOTE — PROGRESS NOTES
Postoperative week 4 status post scleral buckle -element removal left eye     Doing well   Retina attached  Doing well  Cultures grew Staph Epidermidis    Plan:  Dempsey shield at all times  Retina detachment and endophthalmitis precautions were discussed with the patient and was asked to return if any of the those occur    MR today  MRx done in trial frames  -8.75 +4.00 x 005 20/20   +2.75  J1 both eyes   -11.00 +9.50 x 050 20/40    Medications to operative eye  ocuflox stop  Predforte twice a day for one week then daily  Maxitrol oint  At night for one week and stop     Follow up in 2 months    Lazaro Ledesma MD, PhD  Vitreoretinal Surgery Fellow    ~~~~~~~~~~~~~~~~~~~~~~~~~~~~~~~~~~   Complete documentation of historical and exam elements from today's encounter can be found in the full encounter summary report (not reduplicated in this progress note).  I personally obtained the chief complaint(s) and history of present illness.  I confirmed and edited as necessary the review of systems, past medical/surgical history, family history, social history, and examination findings as documented by others; and I examined the patient myself.  I personally reviewed the relevant tests, images, and reports as documented above.  I personally reviewed the ophthalmic test(s) associated with this encounter, agree with the interpretation(s) as documented by the resident/fellow, and have edited the corresponding report(s) as necessary.   I formulated and edited as necessary the assessment and plan and discussed the findings and management plan with the patient and family    Lina Novak MD  .  Retina Service   Department of Ophthalmology and Visual Neurosciences   [facility]  Phone: [phone]   Fax: [phone]

## 2017-11-02 NOTE — MR AVS SNAPSHOT
After Visit Summary   2017    Paulino Gomez    MRN: [id number]           Patient Information     Date Of Birth          1965        Visit Information        Provider Department      2017 7:30 AM Lina Novak MD [facility]        Today's Diagnoses     Aftercare following surgery of a sensory organ    -  1       Follow-ups after your visit        Follow-up notes from your care team     Return in about 8 weeks (around 2017) for DFE.      Who to contact     Please call your clinic at [phone] to:    Ask questions about your health    Make or cancel appointments    Discuss your medicines    Learn about your test results    Speak to your doctor   If you have compliments or concerns about an experience at your clinic, or if you wish to file a complaint, please contact [facility] Patient Relations at [phone] or email us at [email or web address]         Additional Information About Your Visit        MyChart Information     Mediatonic Gamest is an electronic gateway that provides easy, online access to your medical records. With Glownet, you can request a clinic appointment, read your test results, renew a prescription or communicate with your care team.     To sign up for Mediatonic Gamest visit the website at [email or web address]   You will be asked to enter the access code listed below, as well as some personal information. Please follow the directions to create your username and password.     Your access code is: [email or web address]  Expires: 2017  6:31 AM     Your access code will  in 90 days. If you need help or a new code, please contact your [facility] or call [phone] for assistance.        Care EveryWhere ID     This is your Care EveryWhere ID. This could be used by other organizations to access your [place] medical records  [id number]         Blood Pressure from Last 3 Encounters:   10/04/17  (!) 123/92   05/11/17 124/81   05/03/17 132/90    Weight from Last 3 Encounters:   10/04/17 93 kg (205 lb)   05/11/17 95.3 kg (210 lb)   05/03/17 95.3 kg (210 lb)              Today, you had the following     No orders found for display         Today's Medication Changes          These changes are accurate as of: 11/2/17  8:59 AM.  If you have any questions, ask your nurse or doctor.               These medicines have changed or have updated prescriptions.        Dose/Directions    prednisoLONE acetate 1 % ophthalmic susp   Commonly known as:  PRED FORTE   This may have changed:  Another medication with the same name was removed. Continue taking this medication, and follow the directions you see here.   Changed by:  Lina Novak MD        Dose:  1 drop   Place 1 drop Into the left eye 3 times daily   Refills:  0                Primary Care Provider Office Phone # Fax #    Huy Schilling [phone]       [facility] [address]  [facility] [address]        Equal Access to Services     [facility]: Hadii mina ku hadasho Soomaali, waaxda luqadaha, qaybta kaalmada adeegyada, gabe castanon . So [facility] [phone].    ATENCIÓN: Si habla español, tiene a lucio disposición servicios gratuitos de asistencia lingüística. LlSt. [facility] [phone].    We comply with applicable federal civil rights laws and [place] laws. We do not discriminate on the basis of race, color, national origin, age, disability, sex, sexual orientation, or gender identity.            Thank you!     Thank you for choosing EYE CLINIC  for your care. Our goal is always to provide you with excellent care. Hearing back from our patients is one way we can continue to improve our services. Please take a few minutes to complete the written survey that you may receive in the mail after your visit with us. Thank you!             Your Updated Medication List - Protect others around you: Learn how to safely  use, store and throw away your medicines at www.disposemymeds.org.          This list is accurate as of: 11/2/17  8:59 AM.  Always use your most recent med list.                   Brand Name Dispense Instructions for use Diagnosis    aspirin 81 MG tablet      Take 1 tablet by mouth daily.        carboxymethylcellul-glycerin 0.5-0.9 % Soln ophthalmic solution    OPTIVE/REFRESH OPTIVE     1 drop as needed        cephalexin 750 MG Caps     40 capsule    Take 1 capsule by mouth 3 times daily    Extrusion of scleral buckle, initial encounter       FISH OIL PO      Take 1 tablet by mouth daily        MULTIVITAMIN PO      Take 1 tablet by mouth daily        neomycin-polymixin-dexamethasone ophthalmic suspension    MAXITROL    5 mL    Apply 1 drop to eye 4 times daily Instill into operative eye(s) per physician instructions.    Extrusion of scleral buckle, initial encounter       ofloxacin 0.3 % ophthalmic solution    OCUFLOX    1 Bottle    Place 1 drop Into the left eye 4 times daily    Extrusion of scleral buckle, initial encounter       oxyCODONE-acetaminophen 5-325 MG per tablet    PERCOCET    20 tablet    Take 1-2 tablets by mouth every 4 hours as needed for moderate to severe pain        prednisoLONE acetate 1 % ophthalmic susp    PRED FORTE     Place 1 drop Into the left eye 3 times daily        simvastatin 40 MG tablet    ZOCOR          UNKNOWN TO PATIENT      Take 1 tablet by mouth 3 times daily Kidney stone medication - name unknown        VITAMIN C PO      Take 1 tablet by mouth daily        VITAMIN D (CHOLECALCIFEROL) PO      Take by mouth daily

## 2017-11-02 NOTE — NURSING NOTE
Chief Complaints and History of Present Illnesses   Patient presents with     Follow Up For     3 week follow up s/p scleral buckle -element removal left eye     HPI    Affected eye(s):  Left   Symptoms:     Floaters (Comment: Occasional floater in LE, no changes.)   No flashes   No redness   No Dryness         Do you have eye pain now?:  No      Comments:  Pt states vision is the same as last visit. Pt feeling irritation in LE that comes and goes. Pt notes CODY is still slightly swollen.    Tirso BAR November 2, 2017 7:34 AM

## 2018-04-27 ENCOUNTER — OFFICE VISIT (OUTPATIENT)
Dept: OPHTHALMOLOGY | Facility: CLINIC | Age: 53
End: 2018-04-27
Attending: OPHTHALMOLOGY
Payer: COMMERCIAL

## 2018-04-27 ENCOUNTER — TELEPHONE (OUTPATIENT)
Dept: OPHTHALMOLOGY | Facility: CLINIC | Age: 53
End: 2018-04-27

## 2018-04-27 DIAGNOSIS — H43.811 PVD (POSTERIOR VITREOUS DETACHMENT), RIGHT EYE: Primary | ICD-10-CM

## 2018-04-27 DIAGNOSIS — H52.13 MYOPIA OF BOTH EYES: ICD-10-CM

## 2018-04-27 PROCEDURE — G0463 HOSPITAL OUTPT CLINIC VISIT: HCPCS | Mod: ZF

## 2018-04-27 ASSESSMENT — VISUAL ACUITY
OD_CC+: -1
OS_CC+: -2
METHOD: SNELLEN - LINEAR
OD_CC: 20/20
OS_CC: 20/40
OS_PH_CC: 20/40+2

## 2018-04-27 ASSESSMENT — TONOMETRY
IOP_METHOD: TONOPEN
OS_IOP_MMHG: 19
OD_IOP_MMHG: 15

## 2018-04-27 ASSESSMENT — REFRACTION_WEARINGRX
OS_AXIS: 038
OS_ADD: +2.50
SPECS_TYPE: PAL
OD_AXIS: 005
OS_CYLINDER: +7.75
OD_ADD: +2.50
OD_SPHERE: -8.50
OD_CYLINDER: +4.00
OS_SPHERE: -9.75

## 2018-04-27 ASSESSMENT — CUP TO DISC RATIO
OD_RATIO: 0.5
OS_RATIO: 0.4

## 2018-04-27 ASSESSMENT — SLIT LAMP EXAM - LIDS: COMMENTS: NORMAL

## 2018-04-27 ASSESSMENT — EXTERNAL EXAM - LEFT EYE: OS_EXAM: NORMAL

## 2018-04-27 ASSESSMENT — EXTERNAL EXAM - RIGHT EYE: OD_EXAM: NORMAL

## 2018-04-27 ASSESSMENT — CONF VISUAL FIELD: OS_NORMAL: 1

## 2018-04-27 NOTE — MR AVS SNAPSHOT
After Visit Summary   2018    Paulino Gomez    MRN: [id number]           Patient Information     Date Of Birth          1965        Visit Information        Provider Department      2018 3:30 PM Charli Weztel MD [facility]        Today's Diagnoses     PVD (posterior vitreous detachment), right eye    -  1    Myopia of both eyes           Follow-ups after your visit        Follow-up notes from your care team     Return in about 1 year (around 2019) for DFE with retina.      Who to contact     Please call your clinic at [phone] to:    Ask questions about your health    Make or cancel appointments    Discuss your medicines    Learn about your test results    Speak to your doctor            Additional Information About Your Visit        MyChart Information     Radar da ProduÃ§Ã£ot is an electronic gateway that provides easy, online access to your medical records. With Bruin Brake Cables, you can request a clinic appointment, read your test results, renew a prescription or communicate with your care team.     To sign up for Radar da ProduÃ§Ã£ot visit the website at [email or web address]   You will be asked to enter the access code listed below, as well as some personal information. Please follow the directions to create your username and password.     Your access code is: [email or web address]  Expires: 2018  8:19 PM     Your access code will  in 90 days. If you need help or a new code, please contact your [facility] or call [phone] for assistance.        Care EveryWhere ID     This is your Care EveryWhere ID. This could be used by other organizations to access your [place] medical records  [id number]         Blood Pressure from Last 3 Encounters:   10/04/17 (!) 123/92   17 124/81   17 132/90    Weight from Last 3 Encounters:   10/04/17 93 kg (205 lb)   17 95.3 kg (210 lb)   17 95.3 kg (210 lb)              Today, you had the following     No  orders found for display       Primary Care Provider Office Phone # Fax #    Huy Schilling [phone]       [facility] [address]  [facility] [address]        Equal Access to Services     [facility] : Erlin little leno Sobarbara, waaxda luqadaha, qaybta kaalmada adecollette, gabe jewell laCmkatherine clark. So [facility] [phone].    ATENCIÓN: Si habla español, tiene a lucio disposición servicios gratuitos de asistencia lingüística. Llame al [phone].    We comply with applicable federal civil rights laws and [place] laws. We do not discriminate on the basis of race, color, national origin, age, disability, sex, sexual orientation, or gender identity.            Thank you!     Thank you for choosing EYE CLINIC  for your care. Our goal is always to provide you with excellent care. Hearing back from our patients is one way we can continue to improve our services. Please take a few minutes to complete the written survey that you may receive in the mail after your visit with us. Thank you!             Your Updated Medication List - Protect others around you: Learn how to safely use, store and throw away your medicines at www.disposemymeds.org.          This list is accurate as of 4/27/18 11:59 PM.  Always use your most recent med list.                   Brand Name Dispense Instructions for use Diagnosis    aspirin 81 MG tablet      Take 1 tablet by mouth daily.        carboxymethylcellul-glycerin 0.5-0.9 % Soln ophthalmic solution    OPTIVE/REFRESH OPTIVE     1 drop as needed        cephalexin 750 MG Caps     40 capsule    Take 1 capsule by mouth 3 times daily    Extrusion of scleral buckle, initial encounter       FISH OIL PO      Take 1 tablet by mouth daily        MULTIVITAMIN PO      Take 1 tablet by mouth daily        neomycin-polymixin-dexamethasone ophthalmic suspension    MAXITROL    5 mL    Apply 1 drop to eye 4 times daily Instill into operative eye(s) per physician  instructions.    Extrusion of scleral buckle, initial encounter       ofloxacin 0.3 % ophthalmic solution    OCUFLOX    1 Bottle    Place 1 drop Into the left eye 4 times daily    Extrusion of scleral buckle, initial encounter       oxyCODONE-acetaminophen 5-325 MG per tablet    PERCOCET    20 tablet    Take 1-2 tablets by mouth every 4 hours as needed for moderate to severe pain        prednisoLONE acetate 1 % ophthalmic susp    PRED FORTE     Place 1 drop Into the left eye 3 times daily        simvastatin 40 MG tablet    ZOCOR          UNKNOWN TO PATIENT      Take 1 tablet by mouth 3 times daily Kidney stone medication - name unknown        VITAMIN C PO      Take 1 tablet by mouth daily        VITAMIN D (CHOLECALCIFEROL) PO      Take by mouth daily

## 2018-04-27 NOTE — TELEPHONE ENCOUNTER
Pt with eye concerns  Received: Today       [facility]       Phone Number: [phone]                     Pt of Dr Novak     Pt has a history of torn retina/complications. Pt thinks that he is now experiencing symptoms of torn retina in his good eye. There are black floaters in his eye.     Please call Pt back to discuss if sooner appt is appropriate.         --  Message received by triage at 1306    H/o left eye detachment     Right eye starting today new floaters-- many   No flashings  No curtian/shadow in vision    Offered appt today before weekend and pt accepts    Scheduled under dr. Rashard Galindo RN 2:24 PM 04/27/18

## 2018-04-27 NOTE — PROGRESS NOTES
CC: Floaters    HPI: 51 yo male s/p PPV/FAx/EL/Gas for recurrent RD (2/13/14) and a exposed SB repair in 10/2017, h/o keratoconus OS>>OD, presents with floaters in the right eye, denies any flashes, or visual field constriction, no pain, diplopia, discharge, no recent trauma, high myope    ASSESSMENT/PLAN  Paulino Gomez is a 52 year old male with the following ophthalmologic problems:    1. Posterior vitreous detachment, right eye  - central Brizuela ring  - no peripheral holes, tears, or RDs  - Retinal detachment signs discussed, patient aware to seek immediate care if experiencing these symptoms    2. history of retinal detachment, partial, with single defect, left   -  s/p Pars plana vitrectomy (PPV)/Afx/EL/gas for recurrent RD  (2/13/14)  -  S/p SB/cryo for Retinal detachment  1/2014, repair for exposed SB (10/4/17) - stable  - looks good, retina flat    3. Keratoconus both eyes   -s/p penetrating keratoplasty (PK) left eye (12/2015)  - followed by cornea    4. Pseudophakia left eye   - s/p ceiol (Dr Cabezas) 5/3/2017    Plan: follow up one month in retina clinic for DFE or sooner if needed    Charli Wetzel MD  PGY-2 Ophthalmology  [phone]      Not seen by staff during this visit, available should need have arisen.  Plan appropriate as above.    Darius Castrejon MD  , Comprehensive Ophthalmology  Department of Ophthalmology and Visual Neurosciences  [facility]

## 2018-05-30 ENCOUNTER — OFFICE VISIT (OUTPATIENT)
Dept: OPHTHALMOLOGY | Facility: CLINIC | Age: 53
End: 2018-05-30
Attending: OPHTHALMOLOGY
Payer: COMMERCIAL

## 2018-05-30 DIAGNOSIS — T85.398A EXTRUSION OF SCLERAL BUCKLE, INITIAL ENCOUNTER: ICD-10-CM

## 2018-05-30 DIAGNOSIS — H43.811 PVD (POSTERIOR VITREOUS DETACHMENT), RIGHT EYE: ICD-10-CM

## 2018-05-30 DIAGNOSIS — Z48.810 AFTERCARE FOLLOWING SURGERY OF A SENSORY ORGAN: Primary | ICD-10-CM

## 2018-05-30 PROCEDURE — G0463 HOSPITAL OUTPT CLINIC VISIT: HCPCS | Mod: ZF

## 2018-05-30 RX ORDER — OFLOXACIN 3 MG/ML
1 SOLUTION/ DROPS OPHTHALMIC 4 TIMES DAILY
Qty: 1 BOTTLE | Refills: 0 | Status: SHIPPED | OUTPATIENT
Start: 2018-05-30 | End: 2020-11-18

## 2018-05-30 ASSESSMENT — CUP TO DISC RATIO
OS_RATIO: 0.4
OD_RATIO: 0.5

## 2018-05-30 ASSESSMENT — REFRACTION_WEARINGRX
OS_SPHERE: -9.75
OD_SPHERE: -8.50
OS_AXIS: 038
OS_CYLINDER: +7.75
OS_ADD: +2.50
OD_AXIS: 005
OD_ADD: +2.50
SPECS_TYPE: PAL
OD_CYLINDER: +4.00

## 2018-05-30 ASSESSMENT — VISUAL ACUITY
CORRECTION_TYPE: GLASSES
METHOD: SNELLEN - LINEAR
OD_CC+: -1
OD_CC: 20/20
OS_CC+: -1
OS_CC: 20/40

## 2018-05-30 ASSESSMENT — TONOMETRY
OD_IOP_MMHG: 22
OS_IOP_MMHG: 20
IOP_METHOD: TONOPEN

## 2018-05-30 ASSESSMENT — CONF VISUAL FIELD
OD_NORMAL: 1
OS_NORMAL: 1

## 2018-05-30 ASSESSMENT — SLIT LAMP EXAM - LIDS: COMMENTS: NORMAL

## 2018-05-30 ASSESSMENT — EXTERNAL EXAM - RIGHT EYE: OD_EXAM: NORMAL

## 2018-05-30 ASSESSMENT — EXTERNAL EXAM - LEFT EYE: OS_EXAM: NORMAL

## 2018-05-30 NOTE — PROGRESS NOTES
CC: F/u s/p RD repair per Dr cabezas, visual acuity wavy left eye    Interval history: Here for routine f/u s/p SB removal OS 10/4/17. Had new floaters OD 4/27/18 and was diagnosed with PVD in general clinic. Patient reports stable floaters OD, no flashes. Occ dryness OS improved with ATs.    HPI: 52 yo male s/p PPV/FAx/EL/Gas for recurrent RD (2/13/14) who also has h/o keratoconus OS>>OD.  Reports he had teary discharge that started about 2-3 weeks ago as well as red eye. No change to his vision. He saw Dr Cabezas 1 day ago and found to have an exposed scleral buckle with possible macular edema     OCT: 10-4-17  Right eye- within normal limits. Good foveal contour  Left eye- trace Epiretinal membrane, superotemopral IS/OS dropout, slightly blunted foveal contour    ASSESSMENT/PLAN  Paulino Gomez is a 52 year old male with the following ophthalmologic problems:    1. S/p scleral buckle removal OS 10/4/17  -Due to exposed scleral buckle    2. history of retinal detachment, partial, with single defect, left   -s/p Pars plana vitrectomy (PPV)/Afx/EL/gas for recurrent RD  (2/13/14)  -S/p SB/cryo for Retinal detachment  1/2014  - looks good, retina flat    3. PVD, right eye  -reports stable floaters OD  -Retinal detachment/retinal tear precautions discussed with patient. Contact clinic immediately for new floaters/flashers or shadows in vision    4. Keratoconus both eyes   -s/p penetrating keratoplasty (PK) left eye (12/2015)  - followed by cornea    Temporal K suture loose at the site of the cataract incision  Cornea suture removal:  1gtt proparacaine given  1gtt of betadine 5% administered  Cornea suture removed without complications  1gtt of betadine 5% administered    Plan:  Ofloxacin drops four times a day  For 3 days    5. 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  I personally obtained the chief complaint(s) and history of present illness.  I confirmed and edited as necessary the review of systems, past medical/surgical history, family history, social history, and examination findings as documented by others; and I examined the patient myself.  I personally reviewed the relevant tests, images, and reports as documented above.  I personally reviewed the ophthalmic test(s) associated with this encounter, agree with the interpretation(s) as documented by the resident/fellow, and have edited the corresponding report(s) as necessary.   I formulated and edited as necessary the assessment and plan and discussed the findings and management plan with the patient and family    Lina Novak MD  .  Retina Service   Department of Ophthalmology and Visual Neurosciences   [facility]  Phone: [phone]   Fax: [phone]

## 2018-05-30 NOTE — MR AVS SNAPSHOT
After Visit Summary   2018    Paulino Gomez    MRN: [id number]           Patient Information     Date Of Birth          1965        Visit Information        Provider Department      2018 8:00 AM L[facility]ak [facility]        Today's Diagnoses     Aftercare following surgery of a sensory organ    -  1    PVD (posterior vitreous detachment), right eye        Extrusion of scleral buckle, initial encounter           Follow-ups after your visit        Follow-up notes from your care team     Return in about 1 year (around 2019) for f/u SB removal os.      Who to contact     Please call your clinic at [phone] to:    Ask questions about your health    Make or cancel appointments    Discuss your medicines    Learn about your test results    Speak to your doctor            Additional Information About Your Visit        MyChart Information     [employer] is an electronic gateway that provides easy, online access to your medical records. With [employer], you can request a clinic appointment, read your test results, renew a prescription or communicate with your care team.     To sign up for [employer] visit the website at [email or web address]   You will be asked to enter the access code listed below, as well as some personal information. Please follow the directions to create your username and password.     Your access code is: [email or web address]  Expires: 2018  8:19 PM     Your access code will  in 90 days. If you need help or a new code, please contact your [facility] or call [phone] for assistance.        Care EveryWhere ID     This is your Care EveryWhere ID. This could be used by other organizations to access your [facility] medical records  [id number]         Blood Pressure from Last 3 Encounters:   10/04/17 (!) 123/92   17 124/81   17 132/90    Weight from Last 3 Encounters:   10/04/17 93 kg (205 lb)   17 95.3  kg (210 lb)   05/03/17 95.3 kg (210 lb)              Today, you had the following     No orders found for display         Where to get your medicines      These medications were sent to [facility] [id number]  [place], [place] - [address] [facility] [address]  [address] [place] [address]     Phone:  [phone]     ofloxacin 0.3 % ophthalmic solution          Primary Care Provider Office Phone # Fax #    Huy Schilling [phone]       [facility]   [place] [address]        Equal Access to Services     [facility] : Hadii aad anabel hadasho Sogarthali, waaxda luqadaha, qaybta kaalmada adesouravyada, gabe castanon . So [facility] [phone].    ATENCIÓN: Si habla español, tiene a lucio disposición servicios gratuitos de asistencia lingüística. [facility] [phone].    We comply with applicable federal civil rights laws and [place] laws. We do not discriminate on the basis of race, color, national origin, age, disability, sex, sexual orientation, or gender identity.            Thank you!     Thank you for choosing [facility]  for your care. Our goal is always to provide you with excellent care. Hearing back from our patients is one way we can continue to improve our services. Please take a few minutes to complete the written survey that you may receive in the mail after your visit with us. Thank you!             Your Updated Medication List - Protect others around you: Learn how to safely use, store and throw away your medicines at www.disposemymeds.org.          This list is accurate as of 5/30/18  9:58 AM.  Always use your most recent med list.                   Brand Name Dispense Instructions for use Diagnosis    aspirin 81 MG tablet      Take 1 tablet by mouth daily.        carboxymethylcellul-glycerin 0.5-0.9 % Soln ophthalmic solution    OPTIVE/REFRESH OPTIVE     1 drop as needed        cephalexin 750 MG Caps     40 capsule    Take 1  capsule by mouth 3 times daily    Extrusion of scleral buckle, initial encounter       FISH OIL PO      Take 1 tablet by mouth daily        MULTIVITAMIN PO      Take 1 tablet by mouth daily        neomycin-polymixin-dexamethasone ophthalmic suspension    MAXITROL    5 mL    Apply 1 drop to eye 4 times daily Instill into operative eye(s) per physician instructions.    Extrusion of scleral buckle, initial encounter       ofloxacin 0.3 % ophthalmic solution    OCUFLOX    1 Bottle    Place 1 drop Into the left eye 4 times daily    Aftercare following surgery of a sensory organ       oxyCODONE-acetaminophen 5-325 MG per tablet    PERCOCET    20 tablet    Take 1-2 tablets by mouth every 4 hours as needed for moderate to severe pain        prednisoLONE acetate 1 % ophthalmic susp    PRED FORTE     Place 1 drop Into the left eye 3 times daily        simvastatin 40 MG tablet    ZOCOR          UNKNOWN TO PATIENT      Take 1 tablet by mouth 3 times daily Kidney stone medication - name unknown        VITAMIN C PO      Take 1 tablet by mouth daily        VITAMIN D (CHOLECALCIFEROL) PO      Take by mouth daily

## 2018-05-30 NOTE — NURSING NOTE
Chief Complaints and History of Present Illnesses   Patient presents with     Follow Up For     PVD (posterior vitreous detachment), right eye     HPI    Affected eye(s):  Both   Symptoms:        Duration:  1 month   Frequency:  Constant       Do you have eye pain now?:  No      Comments:  Pt. States that he is still seeing floaters RE.  No flashes LE.  No c/o comfort BE.  Fatoumata MENESES 8:22 AM May 30, 2018

## 2018-07-17 DIAGNOSIS — H35.722 SEROUS DETACHMENT OF RETINAL PIGMENT EPITHELIUM OF LEFT EYE: ICD-10-CM

## 2018-07-17 DIAGNOSIS — Z94.7 S/P PKP (PENETRATING KERATOPLASTY): Primary | ICD-10-CM

## 2018-07-18 NOTE — TELEPHONE ENCOUNTER
Medication:pred forte      Last Written Prescription Date:  Pt reported    Last Office Visit: 5/30/18  Future Office visit:no pending appt    Attending Provider: Dr. Novak  Last Clinic Note: 11/2/17 note: Predforte twice a day for one week then daily    Routing refill request to provider for review/approval because:  Pred Forte not mentioned in recent clinic note  Not on protocol

## 2018-07-19 RX ORDER — PREDNISOLONE ACETATE 10 MG/ML
1 SUSPENSION/ DROPS OPHTHALMIC DAILY
Qty: 5 ML | Refills: 3 | Status: SHIPPED | OUTPATIENT
Start: 2018-07-19 | End: 2019-10-21

## 2019-10-21 DIAGNOSIS — Z94.7 S/P PKP (PENETRATING KERATOPLASTY): ICD-10-CM

## 2019-10-21 RX ORDER — PREDNISOLONE ACETATE 10 MG/ML
1 SUSPENSION/ DROPS OPHTHALMIC DAILY
Qty: 5 ML | Refills: 0 | Status: SHIPPED | OUTPATIENT
Start: 2019-10-21 | End: 2020-04-06

## 2019-10-21 NOTE — TELEPHONE ENCOUNTER
I will give him a single, small bottle for a refill but he needs to call the clinic and make an appointment for any additional refills. It appears his IOP has been okay at the clinic visits while on PF. If he is following up with Dr. Cabezas somewhere else, he should contact that clinic for refills if they have seen him more recently.

## 2019-10-21 NOTE — TELEPHONE ENCOUNTER
Medication: prednisoLONE acetate (PRED FORTE) 1 % ophthalmic suspension    Requested directions: 1 drop Into the left eye daily  Current directions on the medication list: same    Last Written Prescription Date:  7-19-18  Last Fill Quantity: 5 ml,   # refills: 3    Last Office Visit: 5-30-18  Future Office visit: none    Attending Provider: Scarlet  Last Clinic Note: 11/2/17 note: Predforte twice a day for one week then daily     Routing refill request to provider for review/approval because:  Pred Forte not mentioned in recent clinic note  Requires provider review

## 2019-11-06 ENCOUNTER — TELEPHONE (OUTPATIENT)
Dept: OPHTHALMOLOGY | Facility: CLINIC | Age: 54
End: 2019-11-06

## 2019-11-06 NOTE — TELEPHONE ENCOUNTER
H/o cornea transplant  Rx for prednisolone refill by Dr. Ovalle with instructions to f/u for further refills    Pt last seen in 2017 by Dr. Cabezas and may 2018 by Dr. Novak    Left message with direct number to review scheduling follow up eye exams     Wiliam Galindo RN RN 10:45 AM 11/06/19

## 2020-04-06 ENCOUNTER — TELEPHONE (OUTPATIENT)
Dept: OPHTHALMOLOGY | Facility: CLINIC | Age: 55
End: 2020-04-06

## 2020-04-06 DIAGNOSIS — Z94.7 S/P PKP (PENETRATING KERATOPLASTY): ICD-10-CM

## 2020-04-06 RX ORDER — PREDNISOLONE ACETATE 10 MG/ML
1 SUSPENSION/ DROPS OPHTHALMIC DAILY
Qty: 5 ML | Refills: 0 | Status: SHIPPED | OUTPATIENT
Start: 2020-04-06 | End: 2020-08-13

## 2020-04-06 NOTE — TELEPHONE ENCOUNTER
Refilled Prednisolone as medication needed to prevent PK rejection. Recommend follow-up in cornea clinic once COVID-19 restrictions removed. Will need to be seen in clinic for additional refills.    Dimitri Dallas MD  Ophthalmology Resident, PGY-3

## 2020-04-06 NOTE — TELEPHONE ENCOUNTER
[facility] Call Center    Phone Message    May a detailed message be left on voicemail: yes     Reason for Call: Medication Refill Request    Has the patient contacted the pharmacy for the refill? Yes   Name of medication being requested: prednisoLONE acetate (PRED FORTE) 1 % ophthalmic suspension   Provider who prescribed the medication:   Pharmacy: [id number] - [place], [place] - [address] AT [facility] [address]   Date medication is needed: asap he is out of this medication          Action Taken: Message routed to:  [facility]    Travel Screening: Not Applicable

## 2020-08-12 DIAGNOSIS — Z94.7 S/P PKP (PENETRATING KERATOPLASTY): ICD-10-CM

## 2020-08-13 RX ORDER — PREDNISOLONE ACETATE 10 MG/ML
1 SUSPENSION/ DROPS OPHTHALMIC DAILY
Qty: 5 ML | Refills: 0 | Status: SHIPPED | OUTPATIENT
Start: 2020-08-13 | End: 2020-10-13

## 2020-10-13 ENCOUNTER — OFFICE VISIT (OUTPATIENT)
Dept: OPHTHALMOLOGY | Facility: CLINIC | Age: 55
End: 2020-10-13
Attending: OPHTHALMOLOGY
Payer: COMMERCIAL

## 2020-10-13 DIAGNOSIS — Z94.7 S/P PKP (PENETRATING KERATOPLASTY): Primary | ICD-10-CM

## 2020-10-13 PROCEDURE — 99213 OFFICE O/P EST LOW 20 MIN: CPT | Mod: GC | Performed by: OPHTHALMOLOGY

## 2020-10-13 PROCEDURE — G0463 HOSPITAL OUTPT CLINIC VISIT: HCPCS

## 2020-10-13 PROCEDURE — 92015 DETERMINE REFRACTIVE STATE: CPT

## 2020-10-13 RX ORDER — OFLOXACIN 3 MG/ML
1 SOLUTION/ DROPS OPHTHALMIC 4 TIMES DAILY
Qty: 10 ML | Refills: 3 | Status: CANCELLED | OUTPATIENT
Start: 2020-10-13

## 2020-10-13 RX ORDER — PREDNISOLONE ACETATE 10 MG/ML
1 SUSPENSION/ DROPS OPHTHALMIC DAILY
Qty: 15 ML | Refills: 6 | Status: SHIPPED | OUTPATIENT
Start: 2020-10-13 | End: 2021-10-14

## 2020-10-13 ASSESSMENT — REFRACTION_WEARINGRX
OS_AXIS: 038
OD_CYLINDER: +4.00
OD_AXIS: 005
SPECS_TYPE: PAL
OD_SPHERE: -8.50
OS_SPHERE: -9.75
OD_ADD: +2.50
OS_CYLINDER: +7.75
OS_ADD: +2.50

## 2020-10-13 ASSESSMENT — REFRACTION_MANIFEST
OS_AXIS: 050
OD_SPHERE: -8.25
OS_SPHERE: -10.00
OD_ADD: +2.50
OD_AXIS: 003
OS_ADD: +2.50
OD_CYLINDER: +4.00
OS_CYLINDER: +8.50

## 2020-10-13 ASSESSMENT — VISUAL ACUITY
OD_CC: 20/20
OS_CC: 20/40
OS_CC+: -2
CORRECTION_TYPE: GLASSES
METHOD: SNELLEN - LINEAR

## 2020-10-13 ASSESSMENT — PACHYMETRY
OS_CT(UM): 594
OD_CT(UM): 565

## 2020-10-13 ASSESSMENT — EXTERNAL EXAM - LEFT EYE: OS_EXAM: NORMAL

## 2020-10-13 ASSESSMENT — CONF VISUAL FIELD
OD_NORMAL: 1
OS_NORMAL: 1

## 2020-10-13 ASSESSMENT — TONOMETRY
IOP_METHOD: ICARE
OD_IOP_MMHG: 20
OS_IOP_MMHG: 20

## 2020-10-13 ASSESSMENT — EXTERNAL EXAM - RIGHT EYE: OD_EXAM: NORMAL

## 2020-10-13 ASSESSMENT — SLIT LAMP EXAM - LIDS: COMMENTS: NORMAL

## 2020-10-13 NOTE — NURSING NOTE
Chief Complaints and History of Present Illnesses   Patient presents with     Cornea Transplant Follow Up     Chief Complaint(s) and History of Present Illness(es)     Cornea Transplant Follow Up     Laterality: left eye    Onset: 1 year ago    Quality: blurred vision    Severity: mild    Frequency: constantly    Course: gradually worsening    Associated symptoms: dryness.  Negative for eye pain              Comments     Pt. States that VA seems to have worsened BE over the last year. No pain BE, some dryness LE. No flashes BE, occasional floaters.   Fatoumata Pritchett COT 8:02 AM October 13, 2020

## 2020-10-13 NOTE — PROGRESS NOTES
CC: Blurred vision LE since 1 year ago.    HPI: Paulino Gomez is a 55 year old male who presents for follow up of left eye PK 2 years ago and left eye CEIOL in May 2017. He reports that overall the left eye vision seems a bit cloudier over the past few years. He notes improvement after refraction today. Used to wear hard CL's prior to PKP surgery with success but hasn't worn since. Vision is stable in the right eye. No new flashes, floaters, or diplopia. No pain, redness, or tearing.       POHx:  2 retinal surgeries left eye  PK OS 12/2015  LE CEIOL     Current Eye Medications:   Prednisolone once daily OS  Artificial tears PRN    Review of Testing:  Irregular astigmatism stable from last visit    Assessment & Plan:    #  Keratoconus s/p PK, OS and LE CEIOL.    -Continue prednisolone acetate once daily - refilled today   -Continue artificial tears PRN   - No sutures to remove to decrease cylinder.   -Disc Ak surgery left eye to redcue cylinder or..    - consider appt with Dr. Arthur to retry hard CL trial & update glasses Rx (tried RGPs before PKP surgery with success) - defer glasses Rx due to suture removal today    # H/o RD  - s/p PPV  - h/o exposed scleral buckle, s/p removal by Dr. Novak 10/4/17    # Cataract right eye  - NVS at this time, continue to monitor      RTC: Annually, sooner PRN.     Shivani Miner MD  Cornea & External Disease Fellow    Attending Physician Attestation:  Complete documentation of historical and exam elements from today's encounter can be found in the full encounter summary report (not reduplicated in this progress note).  I personally obtained the chief complaint(s) and history of present illness.  I confirmed and edited as necessary the review of systems, past medical/surgical history, family history, social history, and examination findings as documented by others; and I examined the patient myself.  I personally reviewed the relevant tests, images, and reports as  documented above.  I formulated and edited as necessary the assessment and plan and discussed the findings and management plan with the patient and family. - Huy Braun MD

## 2020-11-18 ENCOUNTER — OFFICE VISIT (OUTPATIENT)
Dept: OPHTHALMOLOGY | Facility: CLINIC | Age: 55
End: 2020-11-18
Attending: OPHTHALMOLOGY
Payer: COMMERCIAL

## 2020-11-18 DIAGNOSIS — H43.811 PVD (POSTERIOR VITREOUS DETACHMENT), RIGHT: ICD-10-CM

## 2020-11-18 DIAGNOSIS — H26.492 LEFT POSTERIOR CAPSULAR OPACIFICATION: Primary | ICD-10-CM

## 2020-11-18 PROBLEM — N20.0 KIDNEY STONE: Status: ACTIVE | Noted: 2017-06-27

## 2020-11-18 PROCEDURE — 99213 OFFICE O/P EST LOW 20 MIN: CPT | Performed by: OPHTHALMOLOGY

## 2020-11-18 PROCEDURE — 92134 CPTRZ OPH DX IMG PST SGM RTA: CPT | Performed by: OPHTHALMOLOGY

## 2020-11-18 PROCEDURE — G0463 HOSPITAL OUTPT CLINIC VISIT: HCPCS

## 2020-11-18 ASSESSMENT — CONF VISUAL FIELD
OS_NORMAL: 1
METHOD: COUNTING FINGERS
OD_NORMAL: 1

## 2020-11-18 ASSESSMENT — REFRACTION_WEARINGRX
OS_CYLINDER: +7.75
SPECS_TYPE: PAL
OD_SPHERE: -8.50
OD_ADD: +2.50
OS_AXIS: 038
OS_ADD: +2.50
OD_AXIS: 005
OS_SPHERE: -9.75
OD_CYLINDER: +4.00

## 2020-11-18 ASSESSMENT — TONOMETRY
OD_IOP_MMHG: 22
IOP_METHOD: TONOPEN
OS_IOP_MMHG: 25

## 2020-11-18 ASSESSMENT — EXTERNAL EXAM - LEFT EYE: OS_EXAM: NORMAL

## 2020-11-18 ASSESSMENT — EXTERNAL EXAM - RIGHT EYE: OD_EXAM: NORMAL

## 2020-11-18 ASSESSMENT — SLIT LAMP EXAM - LIDS: COMMENTS: NORMAL

## 2020-11-18 ASSESSMENT — CUP TO DISC RATIO
OS_RATIO: 0.4
OD_RATIO: 0.5

## 2020-11-18 ASSESSMENT — VISUAL ACUITY
METHOD: SNELLEN - LINEAR
OD_CC: 20/20-2
OS_CC: 20/50-2/+2

## 2020-11-18 NOTE — PROGRESS NOTES
CC: F/u s/p RD repair per Dr cabezas, visual acuity wavy left eye    Interval history: Here for routine f/u s/p SB removal OS 10/4/17. Had new floaters OD 4/27/18 and was diagnosed with PVD in general clinic. Patient reports stable floaters OD, no flashes. Occ dryness OS improved with ATs.    HPI: 56 yo male s/p PPV/FAx/EL/Gas for recurrent RD (2/13/14) who also has h/o keratoconus OS>>OD.  Reports he had teary discharge that started about 2-3 weeks ago as well as red eye. No change to his vision. He saw Dr Cabezas 1 day ago and found to have an exposed scleral buckle with possible macular edema     OCT: 11-18-20  Right eye- within normal limits. Good foveal contour  Left eye- trace Epiretinal membrane, superotemopral IS/OS dropout, slightly blunted foveal contour    ASSESSMENT/PLAN  Paulino Gomez is a 52 year old male with the following ophthalmologic problems:    1. S/p scleral buckle removal OS 10/4/17  -Due to exposed scleral buckle    2. history of retinal detachment, partial, with single defect, left   -s/p Pars plana vitrectomy (PPV)/Afx/EL/gas for recurrent RD  (2/13/14)  -S/p SB/cryo for Retinal detachment  1/2014  - looks good, retina flat    3. PVD, right eye  -reports stable floaters OD  -Retinal detachment/retinal tear precautions discussed with patient. Contact clinic immediately for new floaters/flashers or shadows in vision    4. Keratoconus both eyes   -s/p penetrating keratoplasty (PK) left eye (12/2015)  - followed by cornea    5. 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  I personally obtained the chief complaint(s) and history of present illness.  I confirmed and edited as necessary the review of systems, past medical/surgical history, family history, social history, and examination findings as documented by others; and I examined the patient myself.  I personally reviewed the relevant tests, images, and reports as documented above.  I personally reviewed the ophthalmic test(s) associated with this encounter, agree with the interpretation(s) as documented by the resident/fellow, and have edited the corresponding report(s) as necessary.   I formulated and edited as necessary the assessment and plan and discussed the findings and management plan with the patient and family    Lina Novak MD  .  Retina Service   Department of Ophthalmology and Visual Neurosciences   [facility]  Phone: [phone]   Fax: [phone]

## 2021-01-17 NOTE — NURSING NOTE
Chief Complaints and History of Present Illnesses   Patient presents with     New Patient     floaters RE     HPI    Last Eye Exam:  11/2/17   Affected eye(s):  Right   Symptoms:        Duration:  1 day   Frequency:  Constant       Do you have eye pain now?:  No      Comments:  Paulino is here today as a patient new to general. He has seen Dr Novak for some time. He is comp,aining of new floaters that started today RE and are big. He was doing nothing unusual when this happened. He has a history of keratonosus OU   As well as a history of left eye issues.     Ryan Mary COT 4:18 PM April 27, 2018                
1.74

## 2021-03-28 ENCOUNTER — HEALTH MAINTENANCE LETTER (OUTPATIENT)
Age: 56
End: 2021-03-28

## 2021-09-11 ENCOUNTER — HEALTH MAINTENANCE LETTER (OUTPATIENT)
Age: 56
End: 2021-09-11

## 2021-10-14 ENCOUNTER — OFFICE VISIT (OUTPATIENT)
Dept: OPHTHALMOLOGY | Facility: CLINIC | Age: 56
End: 2021-10-14
Attending: OPHTHALMOLOGY
Payer: COMMERCIAL

## 2021-10-14 DIAGNOSIS — Z94.7 S/P PKP (PENETRATING KERATOPLASTY): Primary | ICD-10-CM

## 2021-10-14 PROCEDURE — G0463 HOSPITAL OUTPT CLINIC VISIT: HCPCS

## 2021-10-14 PROCEDURE — 99213 OFFICE O/P EST LOW 20 MIN: CPT | Mod: GC | Performed by: OPHTHALMOLOGY

## 2021-10-14 RX ORDER — PREDNISOLONE ACETATE 10 MG/ML
1 SUSPENSION/ DROPS OPHTHALMIC DAILY
Qty: 15 ML | Refills: 11 | Status: SHIPPED | OUTPATIENT
Start: 2021-10-14 | End: 2023-02-22

## 2021-10-14 ASSESSMENT — CONF VISUAL FIELD
METHOD: COUNTING FINGERS
OS_NORMAL: 1
OD_NORMAL: 1

## 2021-10-14 ASSESSMENT — SLIT LAMP EXAM - LIDS: COMMENTS: NORMAL

## 2021-10-14 ASSESSMENT — REFRACTION_WEARINGRX
OS_ADD: +2.50
OD_AXIS: 003
OD_CYLINDER: +4.00
OS_AXIS: 050
OD_ADD: +2.50
OS_CYLINDER: +8.50
OS_SPHERE: -10.00
OD_SPHERE: -8.25

## 2021-10-14 ASSESSMENT — VISUAL ACUITY
OD_CC: 20/20
OS_CC: 20/40
CORRECTION_TYPE: GLASSES
OS_CC+: +1
METHOD: SNELLEN - LINEAR

## 2021-10-14 ASSESSMENT — TONOMETRY
OS_IOP_MMHG: 19
OD_IOP_MMHG: 18
IOP_METHOD: TONOPEN

## 2021-10-14 ASSESSMENT — PACHYMETRY
OD_CT(UM): 566
OS_CT(UM): 607

## 2021-10-14 ASSESSMENT — EXTERNAL EXAM - RIGHT EYE: OD_EXAM: NORMAL

## 2021-10-14 ASSESSMENT — EXTERNAL EXAM - LEFT EYE: OS_EXAM: NORMAL

## 2021-10-14 NOTE — NURSING NOTE
Chief Complaints and History of Present Illnesses   Patient presents with     Follow Up     S/P PKP (penetrating keratoplasty)     Chief Complaint(s) and History of Present Illness(es)     Follow Up     Laterality: left eye    Course: stable    Associated symptoms: dryness (left eye).  Negative for eye pain, redness and tearing    Treatments tried: eye drops and artificial tears    Pain scale: 0/10    Comments: S/P PKP (penetrating keratoplasty)              Comments     He states that his vision has seemed stable in both eyes, since his last eye exam.      He is using:  Prednisolone once daily left eye   Artificial tears as needed    GIDEON Snyder 7:46 AM  October 14, 2021

## 2021-10-14 NOTE — PROGRESS NOTES
CC: Blurred vision LE since 1 year ago.    HPI: Paulino Gomez is a 55 year old male who presents for follow up of left eye PK 2 years ago and left eye CEIOL in May 2017. He reports that overall the left eye vision seems a bit cloudier over the past few years. He notes improvement after refraction today. Used to wear hard CL's prior to PKP surgery with success but hasn't worn since.     Interval hx: Vision is stable in the right eye. No new flashes, floaters, or diplopia. No pain, redness, or tearing. Doing well.       POHx:  2 retinal surgeries left eye  PK OS 12/2015  LE CEIOL     Current Eye Medications:   Prednisolone once daily OS  Artificial tears PRN    Review of Testing:  Irregular astigmatism stable from last visit    Assessment & Plan:    #  Keratoconus s/p PK, OS and LE CEIOL.    - Continue prednisolone acetate once daily - refilled today   - Continue artificial tears PRN   - No sutures to remove to decrease cylinder.   - Disc Ak surgery left eye to redcue cylinder or..    - consider appt with Dr. Arthur to retry hard CL trial    # H/o RD  - s/p PPV  - h/o exposed scleral buckle, s/p removal by Dr. Novak 10/4/17  - follows Dr. Novak  - RD precautions discussed    # Cataract right eye  - NVS at this time, continue to monitor      RTC: Annually, sooner PRN.     Santa Xavier MD  PGY-3 Resident Physician  Department of Ophthalmology    Attending Physician Attestation:  Complete documentation of historical and exam elements from today's encounter can be found in the full encounter summary report (not reduplicated in this progress note).  I personally obtained the chief complaint(s) and history of present illness.  I confirmed and edited as necessary the review of systems, past medical/surgical history, family history, social history, and examination findings as documented by others; and I examined the patient myself.  I personally reviewed the relevant tests, images, and reports as documented above.   I formulated and edited as necessary the assessment and plan and discussed the findings and management plan with the patient and family. - Huy Braun MD

## 2022-02-22 ENCOUNTER — HOSPITAL ENCOUNTER (EMERGENCY)
Facility: CLINIC | Age: 57
Discharge: HOME | End: 2022-02-22
Attending: EMERGENCY MEDICINE | Admitting: EMERGENCY MEDICINE
Payer: COMMERCIAL

## 2022-02-22 ENCOUNTER — APPOINTMENT (OUTPATIENT)
Dept: GENERAL RADIOLOGY | Facility: CLINIC | Age: 57
End: 2022-02-22
Attending: EMERGENCY MEDICINE
Payer: COMMERCIAL

## 2022-02-22 VITALS
HEART RATE: 64 BPM | SYSTOLIC BLOOD PRESSURE: 130 MMHG | BODY MASS INDEX: 30.06 KG/M2 | HEIGHT: 70 IN | TEMPERATURE: 97.6 F | OXYGEN SATURATION: 96 % | DIASTOLIC BLOOD PRESSURE: 71 MMHG | RESPIRATION RATE: 15 BRPM | WEIGHT: 210 LBS

## 2022-02-22 DIAGNOSIS — R07.9 CHEST PAIN, UNSPECIFIED TYPE: ICD-10-CM

## 2022-02-22 DIAGNOSIS — R42 VERTIGO: ICD-10-CM

## 2022-02-22 LAB
ANION GAP SERPL CALCULATED.3IONS-SCNC: 7 MMOL/L (ref 3–14)
ATRIAL RATE - MUSE: 65 BPM
BASOPHILS # BLD AUTO: 0 10E3/UL (ref 0–0.2)
BASOPHILS NFR BLD AUTO: 1 %
BUN SERPL-MCNC: 12 MG/DL (ref 7–30)
CALCIUM SERPL-MCNC: 9.1 MG/DL (ref 8.5–10.1)
CHLORIDE BLD-SCNC: 106 MMOL/L (ref 94–109)
CO2 SERPL-SCNC: 24 MMOL/L (ref 20–32)
CREAT SERPL-MCNC: 0.94 MG/DL (ref 0.66–1.25)
D DIMER PPP FEU-MCNC: 0.31 UG/ML FEU (ref 0–0.5)
DIASTOLIC BLOOD PRESSURE - MUSE: NORMAL MMHG
EOSINOPHIL # BLD AUTO: 0 10E3/UL (ref 0–0.7)
EOSINOPHIL NFR BLD AUTO: 1 %
ERYTHROCYTE [DISTWIDTH] IN BLOOD BY AUTOMATED COUNT: 12.7 % (ref 10–15)
GFR SERPL CREATININE-BSD FRML MDRD: >90 ML/MIN/1.73M2
GLUCOSE BLD-MCNC: 114 MG/DL (ref 70–99)
HCT VFR BLD AUTO: 45.6 % (ref 40–53)
HGB BLD-MCNC: 14.8 G/DL (ref 13.3–17.7)
HOLD SPECIMEN: NORMAL
HOLD SPECIMEN: NORMAL
IMM GRANULOCYTES # BLD: 0 10E3/UL
IMM GRANULOCYTES NFR BLD: 1 %
INTERPRETATION ECG - MUSE: NORMAL
LYMPHOCYTES # BLD AUTO: 0.9 10E3/UL (ref 0.8–5.3)
LYMPHOCYTES NFR BLD AUTO: 22 %
MCH RBC QN AUTO: 30.5 PG (ref 26.5–33)
MCHC RBC AUTO-ENTMCNC: 32.5 G/DL (ref 31.5–36.5)
MCV RBC AUTO: 94 FL (ref 78–100)
MONOCYTES # BLD AUTO: 0.4 10E3/UL (ref 0–1.3)
MONOCYTES NFR BLD AUTO: 9 %
NEUTROPHILS # BLD AUTO: 2.9 10E3/UL (ref 1.6–8.3)
NEUTROPHILS NFR BLD AUTO: 66 %
NRBC # BLD AUTO: 0 10E3/UL
NRBC BLD AUTO-RTO: 0 /100
P AXIS - MUSE: 11 DEGREES
PLATELET # BLD AUTO: 210 10E3/UL (ref 150–450)
POTASSIUM BLD-SCNC: 3.8 MMOL/L (ref 3.4–5.3)
PR INTERVAL - MUSE: 180 MS
QRS DURATION - MUSE: 84 MS
QT - MUSE: 398 MS
QTC - MUSE: 413 MS
R AXIS - MUSE: -7 DEGREES
RBC # BLD AUTO: 4.85 10E6/UL (ref 4.4–5.9)
SODIUM SERPL-SCNC: 137 MMOL/L (ref 133–144)
SYSTOLIC BLOOD PRESSURE - MUSE: NORMAL MMHG
T AXIS - MUSE: 19 DEGREES
TROPONIN I SERPL HS-MCNC: 5 NG/L
VENTRICULAR RATE- MUSE: 65 BPM
WBC # BLD AUTO: 4.2 10E3/UL (ref 4–11)

## 2022-02-22 PROCEDURE — 85025 COMPLETE CBC W/AUTO DIFF WBC: CPT | Performed by: EMERGENCY MEDICINE

## 2022-02-22 PROCEDURE — 84484 ASSAY OF TROPONIN QUANT: CPT | Performed by: EMERGENCY MEDICINE

## 2022-02-22 PROCEDURE — 85379 FIBRIN DEGRADATION QUANT: CPT | Performed by: EMERGENCY MEDICINE

## 2022-02-22 PROCEDURE — 82310 ASSAY OF CALCIUM: CPT | Performed by: EMERGENCY MEDICINE

## 2022-02-22 PROCEDURE — 71046 X-RAY EXAM CHEST 2 VIEWS: CPT

## 2022-02-22 PROCEDURE — 36415 COLL VENOUS BLD VENIPUNCTURE: CPT | Performed by: EMERGENCY MEDICINE

## 2022-02-22 PROCEDURE — 93005 ELECTROCARDIOGRAM TRACING: CPT

## 2022-02-22 PROCEDURE — 99285 EMERGENCY DEPT VISIT HI MDM: CPT | Mod: 25

## 2022-02-22 RX ORDER — MECLIZINE HCL 25MG 25 MG/1
25 TABLET, CHEWABLE ORAL EVERY 6 HOURS PRN
Qty: 30 TABLET | Refills: 0 | Status: SHIPPED | OUTPATIENT
Start: 2022-02-22

## 2022-02-22 NOTE — ED TRIAGE NOTES
Dizziness started Friday afternoon while working out on the floor. Resolved independently. Intermittantly occured throught the weekend. Started having some intermittent chest discomfort also this weekend. Left precordial chest discomfort. Not reproducible. Pain does not radiate anywhere. No nausea or vomitting. Denies diaphoresis.     Awake, alert and Oriented to person, place, time and situation.    Airway patent.    Respirations are regular and unlabored.  Patient talking in full sentences.  Patient denies cough or shortness of breath.    Pulses are strong and regular with palpation.  Skin is normal color, warm and dry.   Cap refill is less than 3 seconds.

## 2022-02-22 NOTE — ED NOTES
Bed: ED19  Expected date:   Expected time:   Means of arrival:   Comments:  [place] - 60 M abd pain eta 5910

## 2022-02-22 NOTE — ED NOTES
Patient is resting quietly on cart. Respirations are regular and unlabored. Patient repositions self independently on cart.     RN re-introduced self to patient. Updated on continued POC and waits. Denies any dizziness/lightheadedness at present.    Patient denies any needs. VSS. Continue to monitor.

## 2022-02-22 NOTE — ED PROVIDER NOTES
History     Chief Complaint:    Chest Pain (Left precordial chest discomfort. Not reproducible. Pain does not radiate anywhere. No nausea or vomitting. Denies diaphoresis. Family history of CAD and Stroke.) and Dizziness (Started Friday afternoon while working out on the floor. Resolved independently. Intermittantly occured throught the weekend. Started having some intermittent chest discomfort. )      HPI   Paulino Gomez is a 56 year old male who presents with chest pain and dizziness.  He notes on Friday when he was exercising when he stood up he felt a sense of dizziness and lightheadedness.  He felt like it was secondary to standing up too quick but it persisted.  Since then he has had persistent episodes of dizziness that have come and gone.  He notes it feels a little bit more like lightheadedness and a vague sense of nausea.  He denies headache or neck pain.  He denies diplopia dysarthria or dysphagia.  He is not sure of palpitations or arrhythmias.  The dizziness comes and goes throughout the day and it is not necessarily associated with the chest pain but chest pain at times does coincide with it.  Patient is also noted chest pain mainly in the morning upon waking.  Describes it as a heaviness.  Denies any radicular symptoms.  He denies any exertional symptoms.  In terms of cardiac risk factors he has father with cardiac history as well as a brother with history of stroke.  He does not have high blood pressure but he does have high cholesterol and takes medications for this.  He denies diabetes smoking or street drugs.  He drinks red wine on the weekends.  Terms of dehydration he has had a kidney stone in the past and is been fairly cognizant to try to stay hydrated to prevent these.  Patient denies any recent illnesses.  Denies any recent change in medications.  Patient not had any PE risk factors in terms of recent trips or travels history of PE or DVT.    Review of Systems  Positive for chest pain and  dizziness negative for shortness of breath cough fevers chills headache neck pain difficulty with speech or swallowing difficulty with walking all other systems negative    Allergies:      No Known Allergies      Medications:      Ascorbic Acid (VITAMIN C PO)  aspirin 81 MG tablet  carboxymethylcellul-glycerin (OPTIVE/REFRESH OPTIVE) 0.5-0.9 % SOLN ophthalmic solution  meclizine 25 MG CHEW  Omega-3 Fatty Acids (FISH OIL PO)  prednisoLONE acetate (PRED FORTE) 1 % ophthalmic suspension  simvastatin (ZOCOR) 40 MG tablet  TURMERIC PO  VITAMIN D, CHOLECALCIFEROL, PO        Past Medical History:        Past Medical History:   Diagnosis Date     High cholesterol      Keratoconus      Retinal detachment      Patient Active Problem List    Diagnosis Date Noted     Left posterior capsular opacification 11/18/2020     Priority: Medium     Kidney stone 06/27/2017     Priority: Medium     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 Provider to review       Hypercholesteremia 01/28/2013     Priority: Medium        Past Surgical History:        Past Surgical History:   Procedure Laterality Date     ARTHROSCOPY KNEE  12/16/11    Rt knee medial meniscus      CATARACT IOL, RT/LT       DENTAL SURGERY  1989    wisdom tooth extraction     EXPLANT SCLERAL BUCKLE Left 10/4/2017    Procedure: EXPLANT SCLERAL BUCKLE;  LEFT EYE EXPLANTATION OF SCLERAL BUCKLE;  Surgeon: Lina Novak MD;  Location:  [facility]     KERATOPLASTY PENETRATING Left 12/30/2015     PHACOEMULSIFICATION CLEAR CORNEA WITH STANDARD INTRAOCULAR LENS IMPLANT Left 5/3/2017    Procedure: PHACOEMULSIFICATION CLEAR CORNEA WITH STANDARD INTRAOCULAR LENS IMPLANT;  Left  Eye Cataract Extraction with Intraocular lens Implant;   "Surgeon: Reginald Cabezas MD;  [facility]:  [facility]     RETINAL REATTACHMENT  01/31/2014    SB w/ drainage of SR fluid, GB Left Eye     VITRECTOMY PARSPLANA WITH 23 GAUGE SYSTEM  2/13/2014    Procedure: VITRECTOMY PARSPLANA WITH 23 GAUGE SYSTEM;  LEFT VITRECTOMY PARSPLANA WITH 23 GAUGE SYSTEM, ENDOLASER, AIR/FLUID EXCHANGE, INFUSION OF 14% C3F8  GAS;  Surgeon: Lina Novak MD;  Location: [facility]EST[facility],CORNEAL TRANSPLANT         Family History:        Family History   Problem Relation Age of Onset     Heart Disease Father      Hypertension Father      Glaucoma Mother      Glaucoma Maternal Grandfather      Macular Degeneration No family hx of        Social History:  Denies smoking, occasional alcohol     Physical Exam     Patient Vitals for the past 24 hrs:   BP Temp Temp src Pulse Resp SpO2 Height Weight   02/22/22 1805 -- -- -- 64 15 96 % -- --   02/22/22 1800 130/71 -- -- 61 20 97 % -- --   02/22/22 1730 (!) 133/95 -- -- 58 18 97 % -- --   02/22/22 1700 (!) 141/102 -- -- 60 18 99 % -- --   02/22/22 1630 (!) 137/99 -- -- 56 18 96 % -- --   02/22/22 1600 (!) 129/90 -- -- 52 18 95 % -- --   02/22/22 1530 (!) 119/92 -- -- 65 16 96 % -- --   02/22/22 1500 (!) 130/97 -- -- 61 27 98 % -- --   02/22/22 1445 (!) 145/98 -- -- 65 -- 98 % -- --   02/22/22 1225 (!) 146/93 97.6  F (36.4  C) Temporal 80 21 96 % 1.778 m (5' 10\") 95.3 kg (210 lb)       Physical Exam  GENERAL: well developed, pleasant  HEAD: atraumatic  EYES: pupils reactive, extraocular muscles intact, conjunctivae normal  ENT:  mucus membranes moist  NECK:  trachea midline, normal range of motion  RESPIRATORY: no tachypnea, breath sounds clear to auscultation   CVS: normal S1/S2, no murmurs, intact distal pulses  ABDOMEN: soft, nontender, nondistention  MUSCULOSKELETAL: no deformities  SKIN: warm and dry, no acute rashes or ulceration  NEURO: GCS 15, cranial nerves intact, alert and oriented x3, normal finger-to-nose, normal heel-to-shin, Epley " maneuver reproduces sense of dizziness when turning his head to the right  PSYCH:  Mood/affect normal        Emergency Department Course   EKG at 1223 on February 22, 2022 shows a normal sinus rhythm rate of 79 with a  and a QRS of 78 no acute ST or T wave changes indicating ischemia    Imaging:  XR Chest 2 Views   Final Result   IMPRESSION: There are no acute infiltrates. The cardiac silhouette is   not enlarged. Pulmonary vasculature is unremarkable.      SARA CASTILLO MD            SYSTEM ID:  [id number]      Echo Stress Echocardiogram    (Results Pending)       Laboratory:  Labs Ordered and Resulted from Time of ED Arrival to Time of ED Departure   BASIC METABOLIC PANEL - Abnormal       Result Value    Sodium 137      Potassium 3.8      Chloride 106      Carbon Dioxide (CO2) 24      Anion Gap 7      Urea Nitrogen 12      Creatinine 0.94      Calcium 9.1      Glucose 114 (*)     GFR Estimate >90     TROPONIN I - Normal    Troponin I High Sensitivity 5     D DIMER QUANTITATIVE - Normal    D-Dimer Quantitative 0.31     CBC WITH PLATELETS AND DIFFERENTIAL    WBC Count 4.2      RBC Count 4.85      Hemoglobin 14.8      Hematocrit 45.6      MCV 94      MCH 30.5      MCHC 32.5      RDW 12.7      Platelet Count 210      % Neutrophils 66      % Lymphocytes 22      % Monocytes 9      % Eosinophils 1      % Basophils 1      % Immature Granulocytes 1      NRBCs per 100 WBC 0      Absolute Neutrophils 2.9      Absolute Lymphocytes 0.9      Absolute Monocytes 0.4      Absolute Eosinophils 0.0      Absolute Basophils 0.0      Absolute Immature Granulocytes 0.0      Absolute NRBCs 0.0         Procedures:  na    Emergency Department Course:           Reviewed:    I reviewed nursing notes, vitals and past history    Assessments:   I obtained history and examined the patient as noted above.    I rechecked the patient and explained findings.       Consults:            Interventions:    Medications - No data to  display    Disposition:  The patient was discharged to home.    Impression & Plan            CMS Diagnoses:        Medical Decision Making:  Patient presents with intermittent dizziness and lightheadedness after standing up quickly at the gym as well as chest pain that occurs when he wakes up in the morning.  Patient does have a couple cardiac risk factors but has no exertional symptoms mainly when he is waking up.  EKG and troponin and D-dimer are normal.  With the dizziness describes it more as a lightheadedness although his orthostatics were negative and asymptomatic.  Epley maneuver did reproduce symptoms to the right.  Given this standing up and having onset of symptoms with some vague ongoing dizziness when he is working certainly benign positional vertigo is possible as it is reproducible.  He has no findings to suggest a stroke.  Did not hear any murmurs to suggest valvular etiologies.  Patient been on the monitor without any arrhythmias.  We will get him set up for an outpatient stress test him follow-up with his primary care doctor as well as some count his pulse with future episodes and as needed meclizine.    Critical Care time:  none    Covid-19  Paulino Gomez was evaluated during a global COVID-19 pandemic, which necessitated consideration that the patient might be at risk for infection with the SARS-CoV-2 virus that causes COVID-19.   Applicable protocols for evaluation were followed during the patient's care.   COVID-19 was considered as part of the patient's evaluation. The plan for testing is:  the patient was referred for outpatient testing.       Diagnosis:    ICD-10-CM    1. Vertigo  R42    2. Chest pain, unspecified type  R07.9 Echo Stress Echocardiogram       Discharge Medications:  Discharge Medication List as of 2/22/2022  5:46 PM      START taking these medications    Details   meclizine 25 MG CHEW Take 1 tablet (25 mg) by mouth every 6 hours as needed for dizziness, Disp-30 tablet, R-0,  Local Print               Scribe Disclosure:  I, Armond PHOENIX. MD Balaji, am serving as a scribe at 2:44 PM on 2/22/2022 to document services personally performed by Armond Carpio MD based on my observations and the provider's statements to me.      Armond Carpio MD  02/22/22 2917

## 2022-02-23 NOTE — ED NOTES
Patient returned from imaging exam. Placed back on ECG NIBP and SaO2 monitoring. VSS. Patient updated on continued POC and waits. Denies any needs. Continue to monitor .

## 2022-02-25 ENCOUNTER — HOSPITAL ENCOUNTER (OUTPATIENT)
Dept: CARDIOLOGY | Facility: CLINIC | Age: 57
Discharge: HOME | End: 2022-02-25
Attending: EMERGENCY MEDICINE | Admitting: EMERGENCY MEDICINE
Payer: COMMERCIAL

## 2022-02-25 DIAGNOSIS — R07.9 CHEST PAIN, UNSPECIFIED TYPE: ICD-10-CM

## 2022-02-25 PROCEDURE — 93321 DOPPLER ECHO F-UP/LMTD STD: CPT | Mod: 26 | Performed by: INTERNAL MEDICINE

## 2022-02-25 PROCEDURE — 93325 DOPPLER ECHO COLOR FLOW MAPG: CPT | Mod: TC

## 2022-02-25 PROCEDURE — 93018 CV STRESS TEST I&R ONLY: CPT | Performed by: INTERNAL MEDICINE

## 2022-02-25 PROCEDURE — 93321 DOPPLER ECHO F-UP/LMTD STD: CPT | Mod: TC

## 2022-02-25 PROCEDURE — 93016 CV STRESS TEST SUPVJ ONLY: CPT | Performed by: INTERNAL MEDICINE

## 2022-02-25 PROCEDURE — 255N000002 HC RX 255 OP 636: Performed by: EMERGENCY MEDICINE

## 2022-02-25 PROCEDURE — 93325 DOPPLER ECHO COLOR FLOW MAPG: CPT | Mod: 26 | Performed by: INTERNAL MEDICINE

## 2022-02-25 PROCEDURE — 93350 STRESS TTE ONLY: CPT | Mod: 26 | Performed by: INTERNAL MEDICINE

## 2022-02-25 RX ADMIN — HUMAN ALBUMIN MICROSPHERES AND PERFLUTREN 9 ML: 10; .22 INJECTION, SOLUTION INTRAVENOUS at 11:49

## 2022-04-23 ENCOUNTER — HEALTH MAINTENANCE LETTER (OUTPATIENT)
Age: 57
End: 2022-04-23

## 2022-10-04 NOTE — ANESTHESIA POSTPROCEDURE EVALUATION
Patient: Paulino Gomez    Procedure(s):  LEFT EYE EXPLANTATION OF SCLERAL BUCKLE - Wound Class: I-Clean    Diagnosis:exposed scleral buckle left eye   Diagnosis Additional Information: No value filed.    Anesthesia Type:  MAC    Note:  Anesthesia Post Evaluation    Patient location during evaluation: PACU  Patient participation: Able to fully participate in evaluation  Level of consciousness: awake  Pain management: adequate  Airway patency: patent  Cardiovascular status: acceptable  Respiratory status: acceptable  Hydration status: acceptable  PONV: controlled     Anesthetic complications: None          Last vitals:  Vitals:    10/04/17 1920 10/04/17 1930 10/04/17 1940   BP: 128/85 (!) 115/95 (!) 116/99   Resp: 12 11 11   Temp: 36.6  C (97.9  F)     SpO2: 95% 95% 95%         Electronically Signed By: Renaldo Figueredo MD  October 4, 2017  7:47 PM  
none

## 2022-10-30 ENCOUNTER — HEALTH MAINTENANCE LETTER (OUTPATIENT)
Age: 57
End: 2022-10-30

## 2023-02-22 DIAGNOSIS — Z94.7 S/P PKP (PENETRATING KERATOPLASTY): ICD-10-CM

## 2023-02-22 RX ORDER — PREDNISOLONE ACETATE 10 MG/ML
1 SUSPENSION/ DROPS OPHTHALMIC DAILY
Qty: 10 ML | Refills: 2 | Status: SHIPPED | OUTPATIENT
Start: 2023-02-22 | End: 2023-04-26

## 2023-02-22 NOTE — TELEPHONE ENCOUNTER
Pt called to follow up about getting his refill on this Rx. Writer scheduled him an appt with Kade on 3/7 but he said that he has been out of the Rx for days and would like to have it filled before the appt. Please contact pt to discuss    Thank you

## 2023-02-22 NOTE — TELEPHONE ENCOUNTER
prednisoLONE acetate (PRED FORTE) 1 % ophthalmic suspension  Last Written Prescription Date:   10/14/2021  Last Fill Quantity: 15,   # refills: 11  Last Office Visit :  10/14/2021  Future Office visit:  None   Huy Braun MD  Ophthalmology     Assessment & Plan:     #  Keratoconus s/p PK, OS and LE CEIOL.               - Continue prednisolone acetate once daily - refilled today              - Continue artificial tears PRN              - No sutures to remove to decrease cylinder.              - Disc Ak surgery left eye to redcue cylinder or..                     - consider appt with Dr. Arthur to retry hard CL trial     # H/o RD  - s/p PPV  - h/o exposed scleral buckle, s/p removal by Dr. Novak 10/4/17  - follows Dr. Novak  - RD precautions discussed     # Cataract right eye  - NVS at this time, continue to monitor        RTC: Annually, sooner PRN.      Santa Xavier MD  PGY-3 Resident Physician  Department of Ophthalmology    Routing refill request to provider for review/approval because:  Gaps in office visits and refills.  Needing updated office visit.  Please call Pt to schedule.        Heather Campos RN  Central Triage Red Flags/Med Refills

## 2023-02-22 NOTE — TELEPHONE ENCOUNTER
Spoke to pt at 1450    Pt has appt scheduled in march with Dr. Braun    Rx for prednisolone sent     Wiliam Galindo RN 2:52 PM 02/22/23

## 2023-02-22 NOTE — TELEPHONE ENCOUNTER
Called and spoke to someone and they hung up     I was not able to offer an appointment    Ema Valencia Communication Facilitator on 2/22/2023 at 1:33 PM

## 2023-03-07 ENCOUNTER — OFFICE VISIT (OUTPATIENT)
Dept: OPHTHALMOLOGY | Facility: CLINIC | Age: 58
End: 2023-03-07
Attending: OPHTHALMOLOGY
Payer: COMMERCIAL

## 2023-03-07 DIAGNOSIS — Z94.7 S/P PKP (PENETRATING KERATOPLASTY): Primary | ICD-10-CM

## 2023-03-07 PROCEDURE — 76514 ECHO EXAM OF EYE THICKNESS: CPT | Performed by: OPHTHALMOLOGY

## 2023-03-07 PROCEDURE — G0463 HOSPITAL OUTPT CLINIC VISIT: HCPCS | Mod: 25

## 2023-03-07 PROCEDURE — 99214 OFFICE O/P EST MOD 30 MIN: CPT | Mod: GC | Performed by: OPHTHALMOLOGY

## 2023-03-07 RX ORDER — PREDNISOLONE ACETATE 10 MG/ML
1 SUSPENSION/ DROPS OPHTHALMIC DAILY
Qty: 5 ML | Refills: 4 | Status: SHIPPED | OUTPATIENT
Start: 2023-03-07 | End: 2024-03-25

## 2023-03-07 ASSESSMENT — VISUAL ACUITY
METHOD: SNELLEN - LINEAR
OD_CC+: -2
CORRECTION_TYPE: GLASSES
OS_CC+: -3
OS_CC: 20/25
OD_CC: 20/20

## 2023-03-07 ASSESSMENT — CONF VISUAL FIELD
OS_INFERIOR_NASAL_RESTRICTION: 0
OD_SUPERIOR_NASAL_RESTRICTION: 0
OS_SUPERIOR_TEMPORAL_RESTRICTION: 0
OD_INFERIOR_TEMPORAL_RESTRICTION: 0
METHOD: COUNTING FINGERS
OS_NORMAL: 1
OD_NORMAL: 1
OS_SUPERIOR_NASAL_RESTRICTION: 0
OD_SUPERIOR_TEMPORAL_RESTRICTION: 0
OD_INFERIOR_NASAL_RESTRICTION: 0
OS_INFERIOR_TEMPORAL_RESTRICTION: 0

## 2023-03-07 ASSESSMENT — SLIT LAMP EXAM - LIDS
COMMENTS: NORMAL
COMMENTS: NORMAL

## 2023-03-07 ASSESSMENT — EXTERNAL EXAM - RIGHT EYE: OD_EXAM: NORMAL

## 2023-03-07 ASSESSMENT — TONOMETRY
OD_IOP_MMHG: 20
OS_IOP_MMHG: 26
IOP_METHOD: TONOPEN

## 2023-03-07 ASSESSMENT — REFRACTION_WEARINGRX
OD_SPHERE: -8.25
OS_SPHERE: -10.00
OS_ADD: +2.50
OD_CYLINDER: +4.00
OD_AXIS: 003
OD_ADD: +2.50
OS_AXIS: 050
OS_CYLINDER: +8.50

## 2023-03-07 ASSESSMENT — PACHYMETRY
OS_CT(UM): 628
OD_CT(UM): 559

## 2023-03-07 ASSESSMENT — EXTERNAL EXAM - LEFT EYE: OS_EXAM: NORMAL

## 2023-03-07 NOTE — PROGRESS NOTES
CC: Blurred vision LE since 1 year ago.    HPI: Paulino Gomez is a 55 year old male who presents for follow up of left eye PK 2 years ago and left eye CEIOL in May 2017. He reports that overall the left eye vision seems a bit cloudier over the past few years. He notes improvement after refraction today. Used to wear hard CL's prior to PKP surgery with success but hasn't worn since.     Interval hx 3/7/23: Vision feels stable both eyes, eyes are comfortable. No new flashes, floaters, or diplopia. No pain, redness, or tearing. Doing well.     POHx:  2 retinal surgeries left eye  PK OS 12/2015  LE CEIOL     Current Eye Medications:   - Prednisolone once daily OS  - Artificial tears PRN    Review of Testing:  Irregular astigmatism stable from last visit    Assessment & Plan:    #  Keratoconus s/p PK, OS and LE CEIOL.    - Continue prednisolone acetate once daily - refilled today   - Continue artificial tears PRN   - No sutures to remove to decrease cylinder.   - Disc Ak surgery left eye to redcue cylinder or..    - consider appt with Dr. Arthur to retry hard CL trial    # H/o RD left eye  - s/p PPV  - h/o exposed scleral buckle, s/p removal by Dr. Novak 10/4/17  - follows Dr. Novak  - RD precautions discussed    # Cataract right eye  - NVS at this time, continue to monitor    # Posterior capsular opacification, left eye  - Could consider left YAG capsulotomy in future if desired.     RTC: Annually, sooner PRN.   - See Dr Novak for retina and glaucoma check.        Don Guerrero MD  Ophthalmology, PGY-3    Attending Physician Attestation:  Complete documentation of historical and exam elements from today's encounter can be found in the full encounter summary report (not reduplicated in this progress note).  I personally obtained the chief complaint(s) and history of present illness.  I confirmed and edited as necessary the review of systems, past medical/surgical history, family history, social history,  and examination findings as documented by others; and I examined the patient myself.  I personally reviewed the relevant tests, images, and reports as documented above.  I formulated and edited as necessary the assessment and plan and discussed the findings and management plan with the patient and family. - Huy Braun MD

## 2023-03-07 NOTE — NURSING NOTE
Chief Complaints and History of Present Illnesses   Patient presents with     Follow Up     S/P PKP (penetrating keratoplasty), keratoconus each eye      Chief Complaint(s) and History of Present Illness(es)     Follow Up            Laterality: left eye    Course: stable    Associated symptoms: itching (occasional).  Negative for dryness, eye pain and photophobia    Treatments tried: eye drops and artificial tears    Pain scale: 0/10    Comments: S/P PKP (penetrating keratoplasty), keratoconus each eye           Comments    He states that his vision has seemed stable in both eyes, since his last eye exam.    He is using Prednisolone acetates daily in his left eye as well as artificial tears.    Adriana Harrington, COT 9:33 AM  March 7, 2023

## 2023-04-13 DIAGNOSIS — H43.393 VITREOUS SYNERESIS OF BOTH EYES: ICD-10-CM

## 2023-04-13 DIAGNOSIS — H40.003 GLAUCOMA SUSPECT OF BOTH EYES: Primary | ICD-10-CM

## 2023-04-26 ENCOUNTER — OFFICE VISIT (OUTPATIENT)
Dept: OPHTHALMOLOGY | Facility: CLINIC | Age: 58
End: 2023-04-26
Attending: OPHTHALMOLOGY
Payer: COMMERCIAL

## 2023-04-26 DIAGNOSIS — H43.393 VITREOUS SYNERESIS OF BOTH EYES: ICD-10-CM

## 2023-04-26 DIAGNOSIS — H40.003 GLAUCOMA SUSPECT OF BOTH EYES: ICD-10-CM

## 2023-04-26 PROCEDURE — 99214 OFFICE O/P EST MOD 30 MIN: CPT | Performed by: OPHTHALMOLOGY

## 2023-04-26 PROCEDURE — 92083 EXTENDED VISUAL FIELD XM: CPT | Performed by: OPHTHALMOLOGY

## 2023-04-26 PROCEDURE — 92134 CPTRZ OPH DX IMG PST SGM RTA: CPT | Performed by: OPHTHALMOLOGY

## 2023-04-26 PROCEDURE — 99207 OCT OPTIC NERVE RNFL SPECTRALIS OU (BOTH EYES): CPT | Mod: 26 | Performed by: OPHTHALMOLOGY

## 2023-04-26 PROCEDURE — G0463 HOSPITAL OUTPT CLINIC VISIT: HCPCS | Mod: 25 | Performed by: OPHTHALMOLOGY

## 2023-04-26 PROCEDURE — 92133 CPTRZD OPH DX IMG PST SGM ON: CPT | Performed by: OPHTHALMOLOGY

## 2023-04-26 RX ORDER — DORZOLAMIDE HYDROCHLORIDE AND TIMOLOL MALEATE 20; 5 MG/ML; MG/ML
1 SOLUTION/ DROPS OPHTHALMIC 2 TIMES DAILY
Qty: 5 ML | Refills: 11 | Status: SHIPPED | OUTPATIENT
Start: 2023-04-26 | End: 2024-05-21

## 2023-04-26 ASSESSMENT — CONF VISUAL FIELD
OS_SUPERIOR_NASAL_RESTRICTION: 0
OD_SUPERIOR_TEMPORAL_RESTRICTION: 0
OD_INFERIOR_TEMPORAL_RESTRICTION: 0
OS_INFERIOR_TEMPORAL_RESTRICTION: 0
OD_INFERIOR_NASAL_RESTRICTION: 0
OD_SUPERIOR_NASAL_RESTRICTION: 0
OS_INFERIOR_NASAL_RESTRICTION: 0
OS_SUPERIOR_TEMPORAL_RESTRICTION: 0
METHOD: COUNTING FINGERS
OS_NORMAL: 1
OD_NORMAL: 1

## 2023-04-26 ASSESSMENT — VISUAL ACUITY
OS_CC: 20/30-2/+2
OD_CC: 20/20-3
METHOD: SNELLEN - LINEAR
CORRECTION_TYPE: GLASSES

## 2023-04-26 ASSESSMENT — CUP TO DISC RATIO
OD_RATIO: 0.5
OS_RATIO: 0.55

## 2023-04-26 ASSESSMENT — REFRACTION_WEARINGRX
OS_ADD: +2.50
OS_SPHERE: -10.00
OD_CYLINDER: +4.00
OS_CYLINDER: +8.50
OD_ADD: +2.50
OD_SPHERE: -8.25
OS_AXIS: 050
OD_AXIS: 003

## 2023-04-26 ASSESSMENT — SLIT LAMP EXAM - LIDS
COMMENTS: NORMAL
COMMENTS: NORMAL

## 2023-04-26 ASSESSMENT — PACHYMETRY
OS_CT(UM): 628
OD_CT(UM): 559

## 2023-04-26 ASSESSMENT — EXTERNAL EXAM - LEFT EYE: OS_EXAM: NORMAL

## 2023-04-26 ASSESSMENT — TONOMETRY
OS_IOP_MMHG: 29
IOP_METHOD: APPLANATION
OD_IOP_MMHG: 21

## 2023-04-26 ASSESSMENT — EXTERNAL EXAM - RIGHT EYE: OD_EXAM: NORMAL

## 2023-04-26 NOTE — PROGRESS NOTES
CC: retina  And glaucoma follow up    Last retina exam 2020  Interval history: Here for routine retina and glaucoma. History of Retinal detachment ;  s/p SB removal OS 10/4/17. Had new floaters OD 4/27/18 and was diagnosed with PVD in general clinic. Patient reports stable floaters OD, no flashes.   Followed with Dr. Braun for K transplant. Intraocular pressure was high    HPI: 57 yo male s/p PPV/FAx/EL/Gas for recurrent RD (2/13/14) who also has h/o keratoconus OS>>OD.  No change to his vision.     Eyedrops  Predforte  Once a day left eye     Imaging   Optical Coherence Tomography 04/26/23   Right eye- within normal limits. Good foveal contour  Left eye- trace Epiretinal membrane, superotemopral IS/OS dropout, slightly blunted foveal contour    retinal nerve fiber layer 04/26/23   Right eye: diffuse thinning  Left eye: sup and inf thinning     OVF 24-2 04/26/23   Right eye: central spots of decreased sensitivity MD -2.3  Left eye: scattered spots of decreased sensitivity MD -5.5    ASSESSMENT/PLAN  Paulino Gomez is a 58 year old male with the following ophthalmologic problems:    # glaucoma left eye   # Glaucoma suspect right eye  Possibly mix mechanism, history of multiple eye surgeries left eye.   Cup to disc ratio asymmetry L>R.   Thinning on retinal nerve fiber layer both eyes.   Visual fields nonspecific.   Grandfather with history of glaucoma   maximum intraocular pressure 24/29.    Start cosopt twice a day both eyes.   Follow-up glaucoma (Castrejon or [facility] next available).       # history of retinal detachment, partial, with single defect, left   -s/p Pars plana vitrectomy (PPV)/Afx/EL/gas for recurrent RD  (2/13/14)  -S/p SB/cryo for Retinal detachment  1/2014  # S/p scleral buckle removal OS 10/4/17   -Due to exposed scleral buckle  - looks good, retina flat    # history of PVD, right eye  -reports stable floaters OD  -Retinal detachment/retinal tear precautions discussed with patient. Contact clinic  immediately for new floaters/flashers or shadows in vision    # Keratoconus both eyes   -s/p penetrating keratoplasty (PK) left eye (12/2015)  - followed by cornea- Dr. Braun    # Pseudophakia left eye   - s/p ceiol (Dr Cabezas) 5/3/2017  - Posterior capsular opacity (PCO)- consider yag in the future      return to clinic:  Glaucoma clinic in about one month  Retina in 1 year with OCT Mac.     ~~~~~~~~~~~~~~~~~~~~~~~~~~~~~~~~~~   Complete documentation of historical and exam elements from today's encounter can be found in the full encounter summary report (not reduplicated in this progress note).  I personally obtained the chief complaint(s) and history of present illness.  I confirmed and edited as necessary the review of systems, past medical/surgical history, family history, social history, and examination findings as documented by others; and I examined the patient myself.  I personally reviewed the relevant tests, images, and reports as documented above.  I formulated and edited as necessary the assessment and plan and discussed the findings and management plan with the patient and family    Lina Novak MD  .  Retina Service   Department of Ophthalmology and Visual Neurosciences   [facility]  Phone: [phone]   Fax: [phone]

## 2023-05-31 ENCOUNTER — OFFICE VISIT (OUTPATIENT)
Dept: OPHTHALMOLOGY | Facility: CLINIC | Age: 58
End: 2023-05-31
Attending: OPHTHALMOLOGY
Payer: COMMERCIAL

## 2023-05-31 DIAGNOSIS — H33.8 OLD RETINAL DETACHMENT, PARTIAL: ICD-10-CM

## 2023-05-31 DIAGNOSIS — H18.613 STABLE KERATOCONUS OF BOTH EYES: ICD-10-CM

## 2023-05-31 DIAGNOSIS — Z96.1 PSEUDOPHAKIA: ICD-10-CM

## 2023-05-31 DIAGNOSIS — Z94.7 S/P PKP (PENETRATING KERATOPLASTY): ICD-10-CM

## 2023-05-31 DIAGNOSIS — H40.003 GLAUCOMA SUSPECT OF BOTH EYES: Primary | ICD-10-CM

## 2023-05-31 DIAGNOSIS — Z98.890 HISTORY OF VITRECTOMY: ICD-10-CM

## 2023-05-31 PROCEDURE — 92083 EXTENDED VISUAL FIELD XM: CPT | Performed by: OPHTHALMOLOGY

## 2023-05-31 PROCEDURE — G0463 HOSPITAL OUTPT CLINIC VISIT: HCPCS | Performed by: OPHTHALMOLOGY

## 2023-05-31 PROCEDURE — 99214 OFFICE O/P EST MOD 30 MIN: CPT | Performed by: OPHTHALMOLOGY

## 2023-05-31 PROCEDURE — 92133 CPTRZD OPH DX IMG PST SGM ON: CPT | Performed by: OPHTHALMOLOGY

## 2023-05-31 ASSESSMENT — SLIT LAMP EXAM - LIDS
COMMENTS: MILD PTOSIS
COMMENTS: MILD PTOSIS

## 2023-05-31 ASSESSMENT — TONOMETRY
OD_IOP_MMHG: 15
IOP_METHOD: TONOPEN
OS_IOP_MMHG: 16

## 2023-05-31 ASSESSMENT — CONF VISUAL FIELD
OS_INFERIOR_TEMPORAL_RESTRICTION: 0
METHOD: COUNTING FINGERS
OS_INFERIOR_NASAL_RESTRICTION: 0
OS_SUPERIOR_NASAL_RESTRICTION: 0
OD_SUPERIOR_TEMPORAL_RESTRICTION: 0
OD_SUPERIOR_NASAL_RESTRICTION: 0
OS_SUPERIOR_TEMPORAL_RESTRICTION: 0
OS_NORMAL: 1
OD_INFERIOR_NASAL_RESTRICTION: 0
OD_NORMAL: 1
OD_INFERIOR_TEMPORAL_RESTRICTION: 0

## 2023-05-31 ASSESSMENT — REFRACTION_WEARINGRX
OD_CYLINDER: +4.00
OD_SPHERE: -8.25
OS_AXIS: 050
OS_CYLINDER: +8.50
OD_ADD: +2.50
OS_SPHERE: -10.00
OS_ADD: +2.50
OD_AXIS: 003

## 2023-05-31 ASSESSMENT — VISUAL ACUITY
OS_CC: 20/25
OD_CC: 20/20
OD_CC+: -2
OS_CC+: -2
METHOD: SNELLEN - LINEAR

## 2023-05-31 ASSESSMENT — EXTERNAL EXAM - LEFT EYE: OS_EXAM: NORMAL

## 2023-05-31 ASSESSMENT — EXTERNAL EXAM - RIGHT EYE: OD_EXAM: NORMAL

## 2023-05-31 ASSESSMENT — CUP TO DISC RATIO
OD_RATIO: 0.5
OS_RATIO: 0.55

## 2023-05-31 NOTE — PROGRESS NOTES
Chief Complaint(s) and History of Present Illness(es)     Glaucoma Follow Up            Comments: Glaucoma left eye   Glaucoma suspect right eye          Comments    Pt states no change in VA since last visit  Pt states no flashes, floaters eye pain or headaches  Pt compliant with drops    Marina Castrejon COT 8:27 AM May 31, 2023            Review of systems for the eyes was negative other than the pertinent positives/negatives listed in the HPI.      Assessment & Plan      Paulino Gomez is a 58 year old male with the following diagnoses:   1. Glaucoma suspect of both eyes    2. S/P PKP (penetrating keratoplasty) - Left Eye    3. History of vitrectomy - Left Eye    4. Old retinal detachment, partial - Left Eye    5. Stable keratoconus of both eyes    6. Pseudophakia - Left Eye           S/P SB/cryo for Retinal detachment  1/2014  S/P Penetrating keratoplasty (PK) left eye 12/2015  S/P cataract extraction left eye 5/2017  S/P SB removal due to exposure 10/2017  S/P Pars plana vitrectomy (PPV)/Afx/EL/gas for recurrent RD  (2/13/14)    Here today for eval of glaucoma suspicion based on C/D asymmetry left eye >right eye   maximum intraocular pressure 24/29  Famhx in grandfather  Instructed to use cosopt twice a day both eyes   Single OCT Nerve fiber layer in our system (73928) with superior thinning left eye.  Diffuse nonspecific thinning both eyes   No previous visual field tests    OCT Nerve fiber layer today overall stable with moderate reliability in the setting of media opacity and myopic nerves both eyes.      Visual field with nonspecific defects in both eyes - good reliability    Intraocular pressure with good response to Cosopt.  Tolerating well.  Ok to continue twice a day both eyes for now     Patient disposition:   Return in about 4 months (around 9/30/2023) for LVC VF, OCT NFL, DFE.           Attending Physician Attestation:  Complete documentation of historical and exam elements from today's encounter can be  found in the full encounter summary report (not reduplicated in this progress note).  I personally obtained the chief complaint(s) and history of present illness.  I confirmed and edited as necessary the review of systems, past medical/surgical history, family history, social history, and examination findings as documented by others; and I examined the patient myself.  I personally reviewed the relevant tests, images, and reports as documented above.  I formulated and edited as necessary the assessment and plan and discussed the findings and management plan with the patient and family. .I spent a total of 40 minutes reviewing chart, interpreting testing, documenting and face to face with the patient.  Over 50% of this time was spent counseling and coordinating care regarding their diagnosis and management.   - Darius Castrejon MD

## 2023-05-31 NOTE — Clinical Note
Thanks for sending.  It looks like the intraocular pressure elevation was caught early and I would classify him as a high risk ocular hypertensive glaucoma suspect versus early mixed mechanism glaucoma.    He is wondering about simplifying the number of providers going forward.  I told him that either you or I could combine the glaucoma monitoring and dilated fundus exams.  Would you like him to continue to follow with you or me at this time?  I am happy either way.   Have a great week, Lizandro

## 2023-05-31 NOTE — NURSING NOTE
Chief Complaints and History of Present Illnesses   Patient presents with     Glaucoma Follow Up     Glaucoma left eye   Glaucoma suspect right eye     Chief Complaint(s) and History of Present Illness(es)     Glaucoma Follow Up            Comments: Glaucoma left eye   Glaucoma suspect right eye          Comments    Pt states no change in VA since last visit  Pt states no flashes, floaters eye pain or headaches  Pt compliant with drops    Marina Castrejon COT 8:27 AM May 31, 2023

## 2023-11-12 ENCOUNTER — HEALTH MAINTENANCE LETTER (OUTPATIENT)
Age: 58
End: 2023-11-12

## 2023-11-20 DIAGNOSIS — H40.003 GLAUCOMA SUSPECT OF BOTH EYES: Primary | ICD-10-CM

## 2023-11-22 ENCOUNTER — OFFICE VISIT (OUTPATIENT)
Dept: OPHTHALMOLOGY | Facility: CLINIC | Age: 58
End: 2023-11-22
Attending: OPHTHALMOLOGY
Payer: COMMERCIAL

## 2023-11-22 DIAGNOSIS — Z98.890 HISTORY OF VITRECTOMY: ICD-10-CM

## 2023-11-22 DIAGNOSIS — H40.003 GLAUCOMA SUSPECT OF BOTH EYES: Primary | ICD-10-CM

## 2023-11-22 DIAGNOSIS — Z94.7 S/P PKP (PENETRATING KERATOPLASTY): ICD-10-CM

## 2023-11-22 DIAGNOSIS — Z96.1 PSEUDOPHAKIA: ICD-10-CM

## 2023-11-22 DIAGNOSIS — H33.8 OLD RETINAL DETACHMENT, PARTIAL: ICD-10-CM

## 2023-11-22 PROCEDURE — 92083 EXTENDED VISUAL FIELD XM: CPT | Performed by: OPHTHALMOLOGY

## 2023-11-22 PROCEDURE — 92133 CPTRZD OPH DX IMG PST SGM ON: CPT | Performed by: OPHTHALMOLOGY

## 2023-11-22 PROCEDURE — 99214 OFFICE O/P EST MOD 30 MIN: CPT | Performed by: OPHTHALMOLOGY

## 2023-11-22 ASSESSMENT — REFRACTION_WEARINGRX
OS_CYLINDER: +8.50
OD_CYLINDER: +4.00
OS_ADD: +2.50
OD_AXIS: 003
OS_AXIS: 050
OS_SPHERE: -10.00
OD_SPHERE: -8.25
OD_ADD: +2.50

## 2023-11-22 ASSESSMENT — EXTERNAL EXAM - LEFT EYE: OS_EXAM: NORMAL

## 2023-11-22 ASSESSMENT — SLIT LAMP EXAM - LIDS
COMMENTS: NORMAL
COMMENTS: NORMAL

## 2023-11-22 ASSESSMENT — TONOMETRY
IOP_METHOD: TONOPEN
OS_IOP_MMHG: 19
OD_IOP_MMHG: 15

## 2023-11-22 ASSESSMENT — VISUAL ACUITY
OS_CC: 20/30
CORRECTION_TYPE: GLASSES
METHOD: SNELLEN - LINEAR
OD_CC+: -2
OD_CC: 20/25
OS_CC+: -2

## 2023-11-22 ASSESSMENT — CUP TO DISC RATIO
OS_RATIO: 0.55
OD_RATIO: 0.5

## 2023-11-22 ASSESSMENT — EXTERNAL EXAM - RIGHT EYE: OD_EXAM: NORMAL

## 2023-11-22 NOTE — NURSING NOTE
Chief Complaints and History of Present Illnesses   Patient presents with    Glaucoma Follow-Up     Chief Complaint(s) and History of Present Illness(es)       Glaucoma Follow-Up               Comments    Patient hasn't noticed changes in vision. Patient denies pain, flashes of light or increase in floaters.     Patient is currently using Cosopt eye drops twice a day both eyes. Patient is taking Prednisolone drops left eye once a day.     Cher Boateng COT 9:14 AM November 22, 2023 Adriana Harrington, COT 9:24 AM  November 22, 2023

## 2023-11-22 NOTE — PROGRESS NOTES
Chief Complaint(s) and History of Present Illness(es)     Glaucoma Follow-Up           Comments    Patient hasn't noticed changes in vision. Patient denies pain, flashes of   light or increase in floaters.     Patient is currently using Cosopt eye drops twice a day both eyes. Patient   is taking Prednisolone drops left eye once a day.     Cher Boateng COT 9:14 AM November 22, 2023 Adriana Harrington, COT   9:24 AM  November 22, 2023             Review of systems for the eyes was negative other than the pertinent positives/negatives listed in the HPI.          S/P SB/cryo for Retinal detachment  1/2014  S/P Penetrating keratoplasty (PK) left eye 12/2015  S/P cataract extraction left eye 5/2017  S/P SB removal due to exposure 10/2017  S/P Pars plana vitrectomy (PPV)/Afx/EL/gas for recurrent RD  (2/13/14)      Assessment & Plan      Paulino Gomez is a 58 year old male with the following diagnoses:   1. Glaucoma suspect of both eyes    2. S/P PKP (penetrating keratoplasty) - Left Eye    3. History of vitrectomy - Left Eye    4. Old retinal detachment, partial - Left Eye    5. Pseudophakia - Left Eye            Here today for recheck glaucoma suspicion  C/D asymmetry left eye >right eye   Maximum intraocular pressure 24/29  Famhx in grandfather  Using cosopt twice a day both eyes c good compliance     Intraocular pressure with good response to Cosopt.  Tolerating well  Stable OCT Nerve fiber layer (moderate reliability) both eyes   Poor visual field testing today.  Nonglaucomatous appearing    Continue Cosopt twice a day both eyes   Plan for cornea/recheck in 6 mo; Castrejon in 1 year (sooner as needed)    Patient disposition:   Return in about 1 year (around 11/22/2024) for VT only, OCT NFL, LVC VF.           Attending Physician Attestation:  Complete documentation of historical and exam elements from today's encounter can be found in the full encounter summary report (not reduplicated in this progress note).  I  personally obtained the chief complaint(s) and history of present illness.  I confirmed and edited as necessary the review of systems, past medical/surgical history, family history, social history, and examination findings as documented by others; and I examined the patient myself.  I personally reviewed the relevant tests, images, and reports as documented above.  I formulated and edited as necessary the assessment and plan and discussed the findings and management plan with the patient and family. . - Darius Castrejon MD

## 2023-11-22 NOTE — NURSING NOTE
Chief Complaints and History of Present Illnesses   Patient presents with    Glaucoma Follow-Up     Chief Complaint(s) and History of Present Illness(es)       Glaucoma Follow-Up               Comments    Patient hasn't noticed changes in vision. Patient denies pain, flashes of light or increase in floaters.     Patient is currently using Cosopt eye drops twice a day both eyes.     Cher Boateng COT 9:14 AM November 22, 2023

## 2024-02-20 DIAGNOSIS — H33.8 OLD RETINAL DETACHMENT, PARTIAL: Primary | ICD-10-CM

## 2024-03-07 ENCOUNTER — OFFICE VISIT (OUTPATIENT)
Dept: OPHTHALMOLOGY | Facility: CLINIC | Age: 59
End: 2024-03-07
Attending: OPHTHALMOLOGY
Payer: COMMERCIAL

## 2024-03-07 DIAGNOSIS — H18.613 STABLE KERATOCONUS OF BOTH EYES: Primary | ICD-10-CM

## 2024-03-07 DIAGNOSIS — Z94.7 S/P PKP (PENETRATING KERATOPLASTY): ICD-10-CM

## 2024-03-07 DIAGNOSIS — H33.8 OLD RETINAL DETACHMENT, PARTIAL: ICD-10-CM

## 2024-03-07 DIAGNOSIS — Z96.1 PSEUDOPHAKIA: ICD-10-CM

## 2024-03-07 PROCEDURE — 92012 INTRM OPH EXAM EST PATIENT: CPT | Mod: 25 | Performed by: OPHTHALMOLOGY

## 2024-03-07 PROCEDURE — 99214 OFFICE O/P EST MOD 30 MIN: CPT | Mod: GC | Performed by: OPHTHALMOLOGY

## 2024-03-07 PROCEDURE — G0463 HOSPITAL OUTPT CLINIC VISIT: HCPCS | Performed by: OPHTHALMOLOGY

## 2024-03-07 PROCEDURE — 92134 CPTRZ OPH DX IMG PST SGM RTA: CPT | Performed by: OPHTHALMOLOGY

## 2024-03-07 ASSESSMENT — VISUAL ACUITY
OD_CC: 20/25
METHOD: SNELLEN - LINEAR
OS_CC: 20/30
METHOD: SNELLEN - LINEAR
OD_CC+: -3
OS_CC+: -1
OD_CC+: -3
CORRECTION_TYPE: GLASSES
OD_CC: 20/25
OS_CC+: -1
OS_CC: 20/30

## 2024-03-07 ASSESSMENT — CONF VISUAL FIELD
OD_SUPERIOR_NASAL_RESTRICTION: 0
OD_INFERIOR_NASAL_RESTRICTION: 0
OD_INFERIOR_NASAL_RESTRICTION: 0
OS_INFERIOR_NASAL_RESTRICTION: 0
OD_NORMAL: 1
OS_SUPERIOR_NASAL_RESTRICTION: 0
OS_SUPERIOR_NASAL_RESTRICTION: 0
OS_NORMAL: 1
OS_SUPERIOR_TEMPORAL_RESTRICTION: 0
OS_INFERIOR_NASAL_RESTRICTION: 0
OS_INFERIOR_TEMPORAL_RESTRICTION: 0
OD_SUPERIOR_NASAL_RESTRICTION: 0
OD_NORMAL: 1
OD_INFERIOR_TEMPORAL_RESTRICTION: 0
OS_INFERIOR_TEMPORAL_RESTRICTION: 0
METHOD: COUNTING FINGERS
OD_SUPERIOR_TEMPORAL_RESTRICTION: 0
METHOD: COUNTING FINGERS
OD_INFERIOR_TEMPORAL_RESTRICTION: 0
OD_SUPERIOR_TEMPORAL_RESTRICTION: 0
OS_NORMAL: 1
OS_SUPERIOR_TEMPORAL_RESTRICTION: 0

## 2024-03-07 ASSESSMENT — SLIT LAMP EXAM - LIDS
COMMENTS: NORMAL

## 2024-03-07 ASSESSMENT — CUP TO DISC RATIO
OD_RATIO: 0.5
OS_RATIO: 0.4

## 2024-03-07 ASSESSMENT — TONOMETRY
OS_IOP_MMHG: 13
OS_IOP_MMHG: 13
IOP_METHOD: ICARE
OD_IOP_MMHG: 13
OD_IOP_MMHG: 13
IOP_METHOD: TONOPEN

## 2024-03-07 ASSESSMENT — REFRACTION_WEARINGRX
OD_CYLINDER: +4.00
OS_CYLINDER: +8.50
OS_SPHERE: -10.00
OS_AXIS: 050
OD_ADD: +2.50
OS_ADD: +2.50
OD_AXIS: 003
OD_SPHERE: -8.25

## 2024-03-07 ASSESSMENT — EXTERNAL EXAM - LEFT EYE
OS_EXAM: NORMAL
OS_EXAM: NORMAL

## 2024-03-07 ASSESSMENT — EXTERNAL EXAM - RIGHT EYE
OD_EXAM: NORMAL
OD_EXAM: NORMAL

## 2024-03-07 NOTE — NURSING NOTE
Chief Complaints and History of Present Illnesses   Patient presents with    Cornea Transplant Follow Up     S/P PKP (penetrating keratoplasty)     Chief Complaint(s) and History of Present Illness(es)       Cornea Transplant Follow Up              Associated symptoms: floaters.  Negative for dryness, eye pain, tearing and flashes    Treatments tried: eye drops and artificial tears    Pain scale: 0/10    Comments: S/P PKP (penetrating keratoplasty)              Comments    Pt states no change to vision.  No pain.  No flashes.  No change to floaters.  Pt is compliant with drops.    GIDEON Sanches March 7, 2024 7:48 AM

## 2024-03-07 NOTE — PROGRESS NOTES
CC: Blurred vision LE since 1 year ago.    HPI: Paulino Gomez is a male (: 1965) who presents for follow up of left eye PK 2 years ago and left eye CEIOL in May 2017. He reports that overall the left eye vision seems a bit cloudier over the past few years. He notes improvement after refraction today. Used to wear hard CL's prior to PKP surgery with success but hasn't worn since.     Interval hx 3/7/23: Vision feels stable both eyes, eyes are comfortable. No new flashes, floaters, or diplopia. No pain, redness, or tearing. Doing well.     POHx:  2 retinal surgeries left eye  PK OS 2015  LE CEIOL     Current Eye Medications:   - Prednisolone once daily OS  - Artificial tears PRN    Review of Testing:  Irregular astigmatism stable from last visit    Assessment & Plan:    #  Keratoconus s/p PK, OS and LE CEIOL.    - Continue prednisolone acetate once daily - refilled today   - Continue artificial tears PRN   - No sutures to remove to decrease cylinder.   - Disc Ak surgery left eye to redcue cylinder or..    - consider appt with Dr. Arthur to retry hard CL trial    # H/o RD left eye  - s/p PPV  - h/o exposed scleral buckle, s/p removal by Dr. Novak 10/4/17  - follows Dr. Novak  - RD precautions discussed    # Cataract right eye  - NVS at this time, continue to monitor    # Posterior capsular opacification, left eye  - Could consider left YAG capsulotomy in future if desired.     RTC: Annually, sooner PRN.   - See Dr Novak for retina and Dr. Castrejon glaucoma check.      Ashley Baldwin MD  Cornea and External Disease Fellow  [facility]     Attending Physician Attestation:  Complete documentation of historical and exam elements from today's encounter can be found in the full encounter summary report (not reduplicated in this progress note).  I personally obtained the chief complaint(s) and history of present illness.  I confirmed and edited as necessary the review of systems, past  medical/surgical history, family history, social history, and examination findings as documented by others; and I examined the patient myself.  I personally reviewed the relevant tests, images, and reports as documented above.  I formulated and edited as necessary the assessment and plan and discussed the findings and management plan with the patient and family. - Huy Braun MD

## 2024-03-07 NOTE — NURSING NOTE
Chief Complaints and History of Present Illnesses   Patient presents with    Glaucoma Suspect Follow Up     Glaucoma suspect of both eyes     Chief Complaint(s) and History of Present Illness(es)       Glaucoma Suspect Follow Up              Associated symptoms: glare, haloes and floaters.  Negative for dryness, eye pain, tearing and flashes    Pain scale: 0/10    Comments: Glaucoma suspect of both eyes              Comments    Pt states no change to vision.  No pain.  No flashes.  No change to floaters.  Pt is compliant with drops.    GIDEON Sanches March 7, 2024 7:48 AM

## 2024-03-07 NOTE — PROGRESS NOTES
CC: retina follow up  Last retina exam 2023  Interval history: Here for routine retina follow up given History of Retinal detachment ;  s/p SB removal OS 10/4/17. Had new floaters OD 4/27/18 and was diagnosed with PVD in general clinic.   Patient reports stable floaters OD, no flashes.   Followed with Dr. Braun for K transplant and dr zuleta for glaucoma   HPI: 59 yo male s/p PPV/FAx/EL/Gas for recurrent RD (2/13/14) who also has h/o keratoconus OS>>OD.  No change to his vision.     Imaging   Optical Coherence Tomography 03/07/24   Right eye- within normal limits. Good foveal contour  Left eye- trace Epiretinal membrane, superotemopral IS/OS dropout, slightly blunted foveal contour    retinal nerve fiber layer 04/26/23   Right eye: diffuse thinning  Left eye: sup and inf thinning     OVF 24-2 04/26/23   Right eye: central spots of decreased sensitivity MD -2.3  Left eye: scattered spots of decreased sensitivity MD -5.5    ASSESSMENT/PLAN  Paulino Gomez is a 58 year old male with the following ophthalmologic problems:    # glaucoma left eye   # Glaucoma suspect right eye  Possibly mix mechanism, history of multiple eye surgeries left eye.   Cup to disc ratio asymmetry L>R.   Thinning on retinal nerve fiber layer both eyes.   Visual fields nonspecific.   Grandfather with history of glaucoma   maximum intraocular pressure 24/29.  cosopt twice a day both eyes.   Follow-up glaucoma - now followed by Lucía     # history of retinal detachment, partial, with single defect, left   -s/p Pars plana vitrectomy (PPV)/Afx/EL/gas for recurrent RD  (2/13/14)  -S/p SB/cryo for Retinal detachment  1/2014  # S/p scleral buckle removal OS 10/4/17   -Due to exposed scleral buckle  - looks good, retina flat    # history of PVD, right eye  -reports stable floaters OD  -Retinal detachment/retinal tear precautions discussed with patient. Contact clinic immediately for new floaters/flashers or shadows in vision    # Keratoconus both eyes    -s/p penetrating keratoplasty (PK) left eye (12/2015)  - followed by cornea- Dr. Braun  - on Predforte  once a day     # Pseudophakia left eye   - s/p ceiol (Dr Cabezas) 5/3/2017  - Posterior capsular opacity (PCO)- consider yag in the future      return to clinic:  Glaucoma clinic in about one month  Retina in 1 year with OCT Mac.   Retina detachment precautions were discussed with the patient (presence or increased in flashes, floaters or a curtain in the visual field) and was asked to return if any of the those occur     ~~~~~~~~~~~~~~~~~~~~~~~~~~~~~~~~~~   Complete documentation of historical and exam elements from today's encounter can be found in the full encounter summary report (not reduplicated in this progress note).  I personally obtained the chief complaint(s) and history of present illness.  I confirmed and edited as necessary the review of systems, past medical/surgical history, family history, social history, and examination findings as documented by others; and I examined the patient myself.  I personally reviewed the relevant tests, images, and reports as documented above.  I formulated and edited as necessary the assessment and plan and discussed the findings and management plan with the patient and family    Lina Novak MD  .  Retina Service   Department of Ophthalmology and Visual Neurosciences   [facility] [place]  Phone: [phone]   Fax: [phone]

## 2024-03-25 DIAGNOSIS — Z94.7 S/P PKP (PENETRATING KERATOPLASTY): ICD-10-CM

## 2024-03-26 NOTE — TELEPHONE ENCOUNTER
prednisoLONE acetate (PRED FORTE) 1 % ophthalmic suspension   Last Written Prescription Date:  3/7/2023  Last Fill Quantity: 5,   # refills: 4  Last Office Visit : 3/7/2024  Future Office visit:  11/20/2024    Routing refill request to provider for review/approval because:  Drug not on the FM, P or [facility] refill protocol or controlled substance    Heather Campos RN  Central Triage Red Flags/Med Refills

## 2024-03-27 RX ORDER — PREDNISOLONE ACETATE 10 MG/ML
1 SUSPENSION/ DROPS OPHTHALMIC DAILY
Qty: 5 ML | Refills: 2 | Status: SHIPPED | OUTPATIENT
Start: 2024-03-27

## 2024-05-21 DIAGNOSIS — H40.003 GLAUCOMA SUSPECT OF BOTH EYES: ICD-10-CM

## 2024-05-21 RX ORDER — DORZOLAMIDE HYDROCHLORIDE AND TIMOLOL MALEATE 20; 5 MG/ML; MG/ML
1 SOLUTION/ DROPS OPHTHALMIC 2 TIMES DAILY
Qty: 10 ML | Refills: 11 | Status: SHIPPED | OUTPATIENT
Start: 2024-05-21

## 2024-06-12 ENCOUNTER — TELEPHONE (OUTPATIENT)
Dept: OPHTHALMOLOGY | Facility: CLINIC | Age: 59
End: 2024-06-12
Payer: COMMERCIAL

## 2024-06-14 ENCOUNTER — TELEPHONE (OUTPATIENT)
Dept: OPHTHALMOLOGY | Facility: CLINIC | Age: 59
End: 2024-06-14
Payer: COMMERCIAL

## 2024-11-20 ENCOUNTER — OFFICE VISIT (OUTPATIENT)
Dept: OPHTHALMOLOGY | Facility: CLINIC | Age: 59
End: 2024-11-20
Attending: OPHTHALMOLOGY
Payer: COMMERCIAL

## 2024-11-20 DIAGNOSIS — Z96.1 PSEUDOPHAKIA: ICD-10-CM

## 2024-11-20 DIAGNOSIS — H18.613 STABLE KERATOCONUS OF BOTH EYES: ICD-10-CM

## 2024-11-20 DIAGNOSIS — Z98.890 HISTORY OF VITRECTOMY: ICD-10-CM

## 2024-11-20 DIAGNOSIS — H40.003 GLAUCOMA SUSPECT OF BOTH EYES: Primary | ICD-10-CM

## 2024-11-20 DIAGNOSIS — Z94.7 S/P PKP (PENETRATING KERATOPLASTY): ICD-10-CM

## 2024-11-20 PROCEDURE — 92083 EXTENDED VISUAL FIELD XM: CPT | Performed by: OPHTHALMOLOGY

## 2024-11-20 PROCEDURE — 92133 CPTRZD OPH DX IMG PST SGM ON: CPT | Performed by: OPHTHALMOLOGY

## 2024-11-20 ASSESSMENT — TONOMETRY
OS_IOP_MMHG: 25
IOP_METHOD: TONOPEN
OS_IOP_MMHG: 26
IOP_METHOD: TONOPEN
OD_IOP_MMHG: 16
OS_IOP_MMHG: 23
IOP_METHOD: TACTILE

## 2024-11-20 ASSESSMENT — REFRACTION_WEARINGRX
OS_CYLINDER: +8.50
OS_SPHERE: -10.00
OS_ADD: +2.50
OS_AXIS: 050
OD_ADD: +2.50
OD_AXIS: 003
OD_CYLINDER: +4.00
OD_SPHERE: -8.25

## 2024-11-20 ASSESSMENT — VISUAL ACUITY
OS_CC+: -2
OS_CC: 20/30
OD_CC: 20/20
METHOD: SNELLEN - LINEAR
CORRECTION_TYPE: GLASSES
OD_CC+: -2

## 2024-11-20 ASSESSMENT — EXTERNAL EXAM - LEFT EYE: OS_EXAM: NORMAL

## 2024-11-20 ASSESSMENT — EXTERNAL EXAM - RIGHT EYE: OD_EXAM: NORMAL

## 2024-11-20 ASSESSMENT — CUP TO DISC RATIO
OS_RATIO: 0.55
OD_RATIO: 0.5

## 2024-11-20 ASSESSMENT — SLIT LAMP EXAM - LIDS
COMMENTS: MILD PTOSIS
COMMENTS: MILD PTOSIS

## 2024-11-20 NOTE — PROGRESS NOTES
HPI       Follow Up    In both eyes.  Charactertized as  fluctuating.  Since onset it is stable.  Associated symptoms include floaters.  Negative for eye pain and flashes.  Treatments tried include eye drops.             Comments    Here for follow up. VA fluctuates. Compliant with glaucoma and PF drops. Stable floaters without flashes. No pain.    Alex MENESES 7:25 AM November 20, 2024             Last edited by Alex Sim on 11/20/2024  7:25 AM.          Review of systems for the eyes was negative other than the pertinent positives/negatives listed in the HPI.        S/P SB/cryo for Retinal detachment  1/2014  S/P Penetrating keratoplasty (PK) left eye 12/2015  S/P cataract extraction left eye 5/2017  S/P SB removal due to exposure 10/2017  S/P Pars plana vitrectomy (PPV)/Afx/EL/gas for recurrent RD  (2/13/14)        Assessment & Plan      Paulino Gomez is a 59 year old male with the following diagnoses:   1. Glaucoma suspect of both eyes    2. S/P PKP (penetrating keratoplasty)    3. Pseudophakia - Left Eye    4. History of vitrectomy - Left Eye    5. Stable keratoconus of both eyes            Here today for annual recheck of glaucoma suspicion  C/D asymmetry left eye >right eye   Maximum intraocular pressure 24/29  Famhx in grandfather  Using daily Predforte s/p pentrating keratoplasty (PKP)   Using cosopt twice a day both eyes c good compliance     Intraocular pressure 13 mm Hg both eyes since last visit  Today intraocular pressure is elevated left eye   Stable OCT Nerve fiber layer (moderate reliability) both eyes   Improved reliability visual field testing today. Right eye within normal limits, left eye with possible inferior arc, improved from previous      Continue Cosopt twice a day both eyes   Recommend switch from prednisolone to FML or loteprednol if ok by cornea team.  Will schedule to establish with Dr. Camejo in the next month.  Recheck intraocular pressure 1 month after their visit again with  me.    Will consider selected laser trabeculoplasty (SLT) or additional intraocular pressure drop if not back to goal left eye at that time    Patient disposition:   Return in about 2 months (around 1/20/2025) for VT only.           Attending Physician Attestation:  Complete documentation of historical and exam elements from today's encounter can be found in the full encounter summary report (not reduplicated in this progress note).  I personally obtained the chief complaint(s) and history of present illness.  I confirmed and edited as necessary the review of systems, past medical/surgical history, family history, social history, and examination findings as documented by others; and I examined the patient myself.  I personally reviewed the relevant tests, images, and reports as documented above.  I formulated and edited as necessary the assessment and plan and discussed the findings and management plan with the patient and family. . - Darius Castrejon MD

## 2024-12-30 ENCOUNTER — OFFICE VISIT (OUTPATIENT)
Dept: OPHTHALMOLOGY | Facility: CLINIC | Age: 59
End: 2024-12-30
Attending: OPHTHALMOLOGY
Payer: COMMERCIAL

## 2024-12-30 DIAGNOSIS — Z98.890 HISTORY OF VITRECTOMY: ICD-10-CM

## 2024-12-30 DIAGNOSIS — H26.492 LEFT POSTERIOR CAPSULAR OPACIFICATION: ICD-10-CM

## 2024-12-30 DIAGNOSIS — Z94.7 S/P PKP (PENETRATING KERATOPLASTY): Primary | ICD-10-CM

## 2024-12-30 DIAGNOSIS — H18.613 STABLE KERATOCONUS OF BOTH EYES: ICD-10-CM

## 2024-12-30 DIAGNOSIS — H40.003 GLAUCOMA SUSPECT OF BOTH EYES: ICD-10-CM

## 2024-12-30 DIAGNOSIS — Z96.1 PSEUDOPHAKIA: ICD-10-CM

## 2024-12-30 PROCEDURE — G0463 HOSPITAL OUTPT CLINIC VISIT: HCPCS | Performed by: OPHTHALMOLOGY

## 2024-12-30 PROCEDURE — 92025 CPTRIZED CORNEAL TOPOGRAPHY: CPT | Performed by: OPHTHALMOLOGY

## 2024-12-30 PROCEDURE — 76514 ECHO EXAM OF EYE THICKNESS: CPT | Performed by: OPHTHALMOLOGY

## 2024-12-30 RX ORDER — METOPROLOL SUCCINATE 25 MG/1
1 TABLET, EXTENDED RELEASE ORAL DAILY
COMMUNITY
Start: 2024-03-06

## 2024-12-30 RX ORDER — FLUOROMETHOLONE 1 MG/ML
1 SUSPENSION/ DROPS OPHTHALMIC DAILY
Qty: 10 ML | Refills: 11 | Status: SHIPPED | OUTPATIENT
Start: 2024-12-30

## 2024-12-30 ASSESSMENT — REFRACTION_WEARINGRX
OS_SPHERE: -10.00
OD_SPHERE: -8.25
OS_CYLINDER: +8.50
OS_AXIS: 050
OD_CYLINDER: +4.00
OD_AXIS: 003
OD_ADD: +2.50
OS_ADD: +2.50

## 2024-12-30 ASSESSMENT — PACHYMETRY
OD_CT(UM): 559
OS_CT(UM): 646

## 2024-12-30 ASSESSMENT — VISUAL ACUITY
CORRECTION_TYPE: GLASSES
OS_CC: 20/30
METHOD: SNELLEN - LINEAR
OD_CC: 20/20
OS_CC+: -2

## 2024-12-30 ASSESSMENT — TONOMETRY
OS_IOP_MMHG: 21
OD_IOP_MMHG: 19
IOP_METHOD: ICARE

## 2024-12-30 NOTE — NURSING NOTE
"Chief Complaints and History of Present Illnesses   Patient presents with    Cornea Transplant Follow Up     Follow up for PK left eye. Hx KCN each eye.     Patient notes intermittent blurriness in left eye over the last several months. \"I use rewetting drops, and it does help.\"      Chief Complaint(s) and History of Present Illness(es)       Cornea Transplant Follow Up              Laterality: left eye    Onset: months ago    Quality: blurred vision    Course: stable    Associated symptoms: floaters (occasionally).  Negative for eye pain, redness, tearing and flashes    Treatments tried: eye drops    Pain scale: 0/10    Comments: Follow up for PK left eye. Hx KCN each eye.     Patient notes intermittent blurriness in left eye over the last several months. \"I use rewetting drops, and it does help.\"               Comments    Ocular meds:   - Prednisolone once daily left eye   - Cosopt BID each eye   - AT PRGIDEON Junior 7:59 AM 12/30/2024                     "

## 2024-12-30 NOTE — PROGRESS NOTES
"CC:   Keratoconus s/p PK, OS and LE CEIOL    HPI: Paulino Gomez is a male (: 1965) who presents for follow up of left eye PK 2 years ago and left eye CEIOL in May 2017. He reports that overall the left eye vision seems a bit cloudier over the past few years. He notes improvement after refraction today. Used to wear hard CL's prior to PKP surgery with success but hasn't worn since.     Interval hx 2024  Chief Complaint(s) and History of Present Illness(es)       Cornea Transplant Follow Up    In left eye.  This started months ago.  Charactertized as  blurred vision.  Since onset it is stable.  Associated symptoms include floaters (occasionally).  Negative for eye pain, redness, tearing and flashes.  Treatments tried include eye drops.  Pain was noted as 0/10. Additional comments: Follow up for PK left eye. Hx KCN each eye.     Patient notes intermittent blurriness in left eye over the last several months. \"I use rewetting drops, and it does help.\"              Comments    Ocular meds:   - Prednisolone once daily left eye   - Cosopt BID each eye   - AT PRN     Ciara JerriGIDEON 7:59 AM 2024              POHx:  2 retinal surgeries left eye  PK OS 2015  LE CEIOL     Current Eye Medications:  2024  - Prednisolone once daily OS  - Artificial tears PRN  - Cosopt BID each eye    Review of Testing:      Assessment & Plan:    #  Keratoconus s/p PK, OS and LE CEIOL.    - Continue artificial tears PRN   - No sutures to remove to decrease cylinder.  Concerns for high IOP due to steroids left eye.   Stop pred. Start FML daily    # H/o RD left eye  - s/p PPV  - h/o exposed scleral buckle, s/p removal by Dr. Novak 10/4/17  - follows Dr. Novak  - RD precautions discussed    # Cataract right eye  - NVS at this time, continue to monitor    # Posterior capsular opacification, left eye  - Could consider left YAG capsulotomy in future if desired.   Patient want to hold for now    # Glaucoma suspect, " each eye  Cosopt BiD each eye  -under Dr. Castrejon f/up      RTC: Annually, sooner PRN.   - See Dr Novak for retina and Dr. Castrejon glaucoma check.    Plan:  F/up 1 year cornea  Continue prednisolone every day OS    Nessa Ledesma MD  Cornea and External Disease Fellow  [facility]    Pachymetry - Interpretation & Report  Indication:  Performed by: Chandni Camejo MD  Reliability: good  Patient cooperation: good  Findings:   Left eye:  646micrometers centrally   Interval Change, Assessment, & Impact on treatment:   Left eye:  Stable K edema - overall stable   Signed: Chandni Camejo M.D     Attending Physician Attestation:  Complete documentation of historical and exam elements from today's encounter can be found in the full encounter summary report (not reduplicated in this progress note).  I personally obtained the chief complaint(s) and history of present illness.  I confirmed and edited as necessary the review of systems, past medical/surgical history, family history, social history, and examination findings as documented by others; and I examined the patient myself.  I personally reviewed the relevant tests, images, and reports as documented above.  I formulated and edited as necessary the assessment and plan and discussed the findings and management plan with the patient and family. - Chandni Camejo MD

## 2025-02-01 ENCOUNTER — HEALTH MAINTENANCE LETTER (OUTPATIENT)
Age: 60
End: 2025-02-01

## 2025-02-05 ENCOUNTER — OFFICE VISIT (OUTPATIENT)
Dept: OPHTHALMOLOGY | Facility: CLINIC | Age: 60
End: 2025-02-05
Attending: OPHTHALMOLOGY
Payer: COMMERCIAL

## 2025-02-05 DIAGNOSIS — H33.8 OLD RETINAL DETACHMENT, PARTIAL: ICD-10-CM

## 2025-02-05 DIAGNOSIS — Z94.7 S/P PKP (PENETRATING KERATOPLASTY): ICD-10-CM

## 2025-02-05 DIAGNOSIS — Z96.1 PSEUDOPHAKIA: ICD-10-CM

## 2025-02-05 DIAGNOSIS — H40.003 GLAUCOMA SUSPECT OF BOTH EYES: Primary | ICD-10-CM

## 2025-02-05 PROCEDURE — G0463 HOSPITAL OUTPT CLINIC VISIT: HCPCS | Performed by: OPHTHALMOLOGY

## 2025-02-05 ASSESSMENT — VISUAL ACUITY
OD_CC: 20/20-3
OS_CC: 20/30-2/+2
METHOD: SNELLEN - LINEAR
CORRECTION_TYPE: GLASSES

## 2025-02-05 ASSESSMENT — REFRACTION_WEARINGRX
OS_SPHERE: -10.00
OS_AXIS: 050
OS_CYLINDER: +8.50
OD_CYLINDER: +4.00
OD_SPHERE: -8.25
OD_AXIS: 003
OD_ADD: +2.50
OS_ADD: +2.50

## 2025-02-05 ASSESSMENT — TONOMETRY
IOP_METHOD: TONOPEN
OD_IOP_MMHG: 15
OS_IOP_MMHG: 17

## 2025-02-05 NOTE — PROGRESS NOTES
HPI       Glaucoma Suspect Follow Up    In both eyes.  Associated symptoms include floaters (occasional, long standing).  Negative for flashes.  Side effects of treatment include none.  Treatment compliance is always.  Pain was noted as 0/10.             Comments    Pt here for IOP check after changing from Prednisolone to FML.  Denies new concerns/changes since LV here.      Oc meds:  Cosopt BID each eye - LD @ 7:00am today   FML every day left eye   Refresh PRN OU    GIDEON Griffin 11:56 AM 02/05/2025                Last edited by Genny Cantrell on 2/5/2025 11:57 AM.          Review of systems for the eyes was negative other than the pertinent positives/negatives listed in the HPI.      Assessment & Plan      Paulino Gomez is a 59 year old male with the following diagnoses:   1. Glaucoma suspect of both eyes    2. S/P PKP (penetrating keratoplasty)    3. Pseudophakia - Left Eye    4. Old retinal detachment, partial           Here today for intraocular pressure recheck  Doing well on FML.  Keratoplasty looked good with Nell in Dec  Using cosopt twice a day both eyes c good compliance     Intraocular pressure improved  Continue Cosopt twice a day both eyes   Continue FML daily left eye     Patient disposition:   Return in about 6 months (around 8/5/2025) for DFE, OCT NFL, HVF.           Attending Physician Attestation:  Complete documentation of historical and exam elements from today's encounter can be found in the full encounter summary report (not reduplicated in this progress note).  I personally obtained the chief complaint(s) and history of present illness.  I confirmed and edited as necessary the review of systems, past medical/surgical history, family history, social history, and examination findings as documented by others; and I examined the patient myself.  I personally reviewed the relevant tests, images, and reports as documented above.  I formulated and edited as necessary the  assessment and plan and discussed the findings and management plan with the patient and family. . - Darius Castrejon MD

## 2025-02-21 ENCOUNTER — HOSPITAL ENCOUNTER (EMERGENCY)
Facility: CLINIC | Age: 60
Discharge: HOME | End: 2025-02-21
Attending: EMERGENCY MEDICINE | Admitting: EMERGENCY MEDICINE
Payer: COMMERCIAL

## 2025-02-21 ENCOUNTER — APPOINTMENT (OUTPATIENT)
Dept: GENERAL RADIOLOGY | Facility: CLINIC | Age: 60
End: 2025-02-21
Attending: EMERGENCY MEDICINE
Payer: COMMERCIAL

## 2025-02-21 VITALS
DIASTOLIC BLOOD PRESSURE: 95 MMHG | WEIGHT: 225 LBS | HEIGHT: 70 IN | SYSTOLIC BLOOD PRESSURE: 124 MMHG | HEART RATE: 67 BPM | OXYGEN SATURATION: 98 % | TEMPERATURE: 98.4 F | BODY MASS INDEX: 32.21 KG/M2 | RESPIRATION RATE: 20 BRPM

## 2025-02-21 DIAGNOSIS — I48.91 NEW ONSET ATRIAL FIBRILLATION (H): ICD-10-CM

## 2025-02-21 LAB
ANION GAP SERPL CALCULATED.3IONS-SCNC: 10 MMOL/L (ref 7–15)
ATRIAL RATE - MUSE: NORMAL BPM
BASOPHILS # BLD AUTO: 0 10E3/UL (ref 0–0.2)
BASOPHILS NFR BLD AUTO: 1 %
BUN SERPL-MCNC: 24.1 MG/DL (ref 8–23)
CALCIUM SERPL-MCNC: 9.3 MG/DL (ref 8.8–10.4)
CHLORIDE SERPL-SCNC: 107 MMOL/L (ref 98–107)
CREAT SERPL-MCNC: 1 MG/DL (ref 0.67–1.17)
DIASTOLIC BLOOD PRESSURE - MUSE: NORMAL MMHG
EGFRCR SERPLBLD CKD-EPI 2021: 86 ML/MIN/1.73M2
EOSINOPHIL # BLD AUTO: 0.1 10E3/UL (ref 0–0.7)
EOSINOPHIL NFR BLD AUTO: 2 %
ERYTHROCYTE [DISTWIDTH] IN BLOOD BY AUTOMATED COUNT: 12.8 % (ref 10–15)
GLUCOSE SERPL-MCNC: 102 MG/DL (ref 70–99)
HCO3 SERPL-SCNC: 24 MMOL/L (ref 22–29)
HCT VFR BLD AUTO: 44.6 % (ref 40–53)
HGB BLD-MCNC: 15.4 G/DL (ref 13.3–17.7)
HOLD SPECIMEN: NORMAL
IMM GRANULOCYTES # BLD: 0 10E3/UL
IMM GRANULOCYTES NFR BLD: 1 %
INTERPRETATION ECG - MUSE: NORMAL
LYMPHOCYTES # BLD AUTO: 1.2 10E3/UL (ref 0.8–5.3)
LYMPHOCYTES NFR BLD AUTO: 29 %
MAGNESIUM SERPL-MCNC: 2 MG/DL (ref 1.7–2.3)
MCH RBC QN AUTO: 31.4 PG (ref 26.5–33)
MCHC RBC AUTO-ENTMCNC: 34.5 G/DL (ref 31.5–36.5)
MCV RBC AUTO: 91 FL (ref 78–100)
MONOCYTES # BLD AUTO: 0.5 10E3/UL (ref 0–1.3)
MONOCYTES NFR BLD AUTO: 12 %
NEUTROPHILS # BLD AUTO: 2.3 10E3/UL (ref 1.6–8.3)
NEUTROPHILS NFR BLD AUTO: 55 %
NRBC # BLD AUTO: 0 10E3/UL
NRBC BLD AUTO-RTO: 0 /100
P AXIS - MUSE: NORMAL DEGREES
PLATELET # BLD AUTO: 205 10E3/UL (ref 150–450)
POTASSIUM SERPL-SCNC: 4.7 MMOL/L (ref 3.4–5.3)
PR INTERVAL - MUSE: NORMAL MS
QRS DURATION - MUSE: 74 MS
QT - MUSE: 372 MS
QTC - MUSE: 426 MS
R AXIS - MUSE: 22 DEGREES
RBC # BLD AUTO: 4.9 10E6/UL (ref 4.4–5.9)
SODIUM SERPL-SCNC: 141 MMOL/L (ref 135–145)
SYSTOLIC BLOOD PRESSURE - MUSE: NORMAL MMHG
T AXIS - MUSE: 37 DEGREES
TROPONIN T SERPL HS-MCNC: 8 NG/L
TSH SERPL DL<=0.005 MIU/L-ACNC: 2.8 UIU/ML (ref 0.3–4.2)
VENTRICULAR RATE- MUSE: 79 BPM
WBC # BLD AUTO: 4.2 10E3/UL (ref 4–11)

## 2025-02-21 PROCEDURE — 99285 EMERGENCY DEPT VISIT HI MDM: CPT | Mod: 25 | Performed by: EMERGENCY MEDICINE

## 2025-02-21 PROCEDURE — 83735 ASSAY OF MAGNESIUM: CPT | Performed by: EMERGENCY MEDICINE

## 2025-02-21 PROCEDURE — 80048 BASIC METABOLIC PNL TOTAL CA: CPT | Performed by: EMERGENCY MEDICINE

## 2025-02-21 PROCEDURE — 84443 ASSAY THYROID STIM HORMONE: CPT | Performed by: EMERGENCY MEDICINE

## 2025-02-21 PROCEDURE — 36415 COLL VENOUS BLD VENIPUNCTURE: CPT | Performed by: EMERGENCY MEDICINE

## 2025-02-21 PROCEDURE — 85014 HEMATOCRIT: CPT | Performed by: EMERGENCY MEDICINE

## 2025-02-21 PROCEDURE — 71101 X-RAY EXAM UNILAT RIBS/CHEST: CPT | Mod: LT

## 2025-02-21 PROCEDURE — 85004 AUTOMATED DIFF WBC COUNT: CPT | Performed by: EMERGENCY MEDICINE

## 2025-02-21 PROCEDURE — 84484 ASSAY OF TROPONIN QUANT: CPT | Performed by: EMERGENCY MEDICINE

## 2025-02-21 PROCEDURE — 93005 ELECTROCARDIOGRAM TRACING: CPT | Performed by: EMERGENCY MEDICINE

## 2025-02-21 ASSESSMENT — ACTIVITIES OF DAILY LIVING (ADL)
ADLS_ACUITY_SCORE: 41
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
## 2025-02-21 NOTE — DISCHARGE INSTRUCTIONS
You should hold taking your aspirin given you can start Eliquis in the combination of these 2 can overly thin the blood.  I would touch base with your cardiologist about resuming your aspirin when you see them.  If you feel your heart racing or fluttering, you can take an extra dose of your metoprolol.  If you hit your head evenly and a stimulant oculus way, have blood in your urine, blood in your stool or vomit up blood, you should return here to the emergency department given you are on blood thinners.  I would follow-up with your cardiologist as you have planned and you are on the blood thinner to facilitate further management should you need a cardioversion in the future though they may stop this once they see you.

## 2025-03-05 DIAGNOSIS — H40.003 GLAUCOMA SUSPECT OF BOTH EYES: Primary | ICD-10-CM

## 2025-03-13 ENCOUNTER — OFFICE VISIT (OUTPATIENT)
Dept: OPHTHALMOLOGY | Facility: CLINIC | Age: 60
End: 2025-03-13
Attending: OPHTHALMOLOGY
Payer: COMMERCIAL

## 2025-03-13 DIAGNOSIS — H35.372 EPIRETINAL MEMBRANE (ERM) OF LEFT EYE: Primary | ICD-10-CM

## 2025-03-13 DIAGNOSIS — H40.003 GLAUCOMA SUSPECT OF BOTH EYES: ICD-10-CM

## 2025-03-13 PROCEDURE — G0463 HOSPITAL OUTPT CLINIC VISIT: HCPCS | Performed by: OPHTHALMOLOGY

## 2025-03-13 PROCEDURE — 92133 CPTRZD OPH DX IMG PST SGM ON: CPT | Performed by: OPHTHALMOLOGY

## 2025-03-13 PROCEDURE — 92134 CPTRZ OPH DX IMG PST SGM RTA: CPT | Performed by: OPHTHALMOLOGY

## 2025-03-13 ASSESSMENT — REFRACTION_WEARINGRX
OD_ADD: +2.50
OS_AXIS: 050
OS_CYLINDER: +8.50
OD_AXIS: 003
OD_CYLINDER: +4.00
OD_SPHERE: -8.25
OS_ADD: +2.50
OS_SPHERE: -10.00

## 2025-03-13 ASSESSMENT — CONF VISUAL FIELD
OD_NORMAL: 1
OD_INFERIOR_NASAL_RESTRICTION: 0
OD_SUPERIOR_NASAL_RESTRICTION: 0
OD_INFERIOR_TEMPORAL_RESTRICTION: 0
OS_SUPERIOR_NASAL_RESTRICTION: 0
OD_SUPERIOR_TEMPORAL_RESTRICTION: 0
OS_INFERIOR_TEMPORAL_RESTRICTION: 0
METHOD: COUNTING FINGERS
OS_INFERIOR_NASAL_RESTRICTION: 0
OS_SUPERIOR_TEMPORAL_RESTRICTION: 0
OS_NORMAL: 1

## 2025-03-13 ASSESSMENT — TONOMETRY
OS_IOP_MMHG: 21
OD_IOP_MMHG: 18
IOP_METHOD: TONOPEN

## 2025-03-13 ASSESSMENT — VISUAL ACUITY
METHOD: SNELLEN - LINEAR
OS_CC: 20/30
OD_CC: 20/20
OD_CC+: -2
OS_CC+: -2

## 2025-03-13 ASSESSMENT — CUP TO DISC RATIO
OD_RATIO: 0.5
OS_RATIO: 0.4

## 2025-03-13 ASSESSMENT — SLIT LAMP EXAM - LIDS
COMMENTS: NORMAL
COMMENTS: NORMAL

## 2025-03-13 ASSESSMENT — EXTERNAL EXAM - RIGHT EYE: OD_EXAM: NORMAL

## 2025-03-13 ASSESSMENT — EXTERNAL EXAM - LEFT EYE: OS_EXAM: NORMAL

## 2025-03-13 NOTE — NURSING NOTE
Chief Complaints and History of Present Illnesses   Patient presents with    Retinal Detachment Follow Up     Chief Complaint(s) and History of Present Illness(es)       Retinal Detachment Follow Up              Laterality: both eyes    Associated symptoms: floaters    Pain scale: 0/10              Comments    Paulino id here to follow up in the retina department due to history of retinal detachment. Today he states vision seems stable.     Ryan Mary COT 8:43 AM March 13, 2025

## 2025-03-13 NOTE — PROGRESS NOTES
CC: retina follow up  Last retina exam 2024  Interval history: Here for routine retina follow up given History of Retinal detachment ;    s/p SB removal OS 10/4/17. Had new floaters OD 4/27/18 and was diagnosed with PVD in general clinic.   Patient reports stable intermittent floaters both eyes , no flashes. No eye pain     HPI: 57 yo male s/p PPV/FAx/EL/Gas for recurrent RD (2/13/14) who also has h/o keratoconus OS>>OD.  No change to his vision.   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 Good foveal contour  Left eye- trace Epiretinal membrane, superotemopral IS/OS dropout, slightly blunted foveal contour    retinal nerve fiber layer 03/13/25   Right eye: diffuse thinning-- stable  Left eye: sup and inf thinning -- stable    OVF 24-2 04/26/23   Right eye: central spots of decreased sensitivity MD -2.3  Left eye: scattered spots of decreased sensitivity MD -5.5    ASSESSMENT/PLAN  Paulino Gomez is a 58 year old male with the following ophthalmologic problems:    # history of retinal detachment, partial, with single defect, left   -s/p Pars plana vitrectomy (PPV)/Afx/EL/gas for recurrent RD  (2/13/14)  -S/p SB/cryo for Retinal detachment  1/2014  # S/p scleral buckle removal OS 10/4/17   -Due to exposed scleral buckle  - looks good, retina flat    # history of PVD, right eye  -reports stable floaters OD  -Retinal detachment/retinal tear precautions discussed with patient. 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   Cup to disc ratio asymmetry L>R.   Thinning on retinal nerve fiber layer both eyes.   Visual fields nonspecific.   Grandfather with history of glaucoma   maximum intraocular pressure 24/29.  cosopt twice a day  both eyes.     Retinal nerve fiber layer 03/13/25 stable   For glaucoma Follow-up patient will followed by Lucía       # Keratoconus both eyes   -s/p penetrating keratoplasty (PK) left eye (12/2015)  - followed by cornea- Dr. Braun  - on Predforte  once a day     # Pseudophakia left eye   - s/p ceiol (Dr Cabezas) 5/3/2017  - Posterior capsular opacity (PCO)- consider yag in the future      return to clinic:  Glaucoma clinic in about one month  Retina in 1 year with OCT Mac.   Retina detachment precautions were discussed with the patient (presence or increased in flashes, floaters or a curtain in the visual field) and was asked to return if any of the those occur     ~~~~~~~~~~~~~~~~~~~~~~~~~~~~~~~~~~   Complete documentation of historical and exam elements from today's encounter can be found in the full encounter summary report (not reduplicated in this progress note).  I personally obtained the chief complaint(s) and history of present illness.  I confirmed and edited as necessary the review of systems, past medical/surgical history, family history, social history, and examination findings as documented by others; and I examined the patient myself.  I personally reviewed the relevant tests, images, and reports as documented above.  I formulated and edited as necessary the assessment and plan and discussed the findings and management plan with the patient and family    Lina Novak MD  .  Retina Service   Department of Ophthalmology and Visual Neurosciences   [facility]  Phone: [phone]   Fax: [phone]

## 2025-04-03 ENCOUNTER — OFFICE VISIT (OUTPATIENT)
Dept: OPHTHALMOLOGY | Facility: CLINIC | Age: 60
End: 2025-04-03
Payer: COMMERCIAL

## 2025-04-03 ENCOUNTER — NURSE TRIAGE (OUTPATIENT)
Dept: NURSING | Facility: CLINIC | Age: 60
End: 2025-04-03
Payer: COMMERCIAL

## 2025-04-03 DIAGNOSIS — H04.123 DRY EYES, BILATERAL: ICD-10-CM

## 2025-04-03 DIAGNOSIS — H43.393 VITREOUS FLOATERS OF BOTH EYES: Primary | ICD-10-CM

## 2025-04-03 PROCEDURE — G0463 HOSPITAL OUTPT CLINIC VISIT: HCPCS

## 2025-04-03 PROCEDURE — 92134 CPTRZ OPH DX IMG PST SGM RTA: CPT

## 2025-04-03 PROCEDURE — 92250 FUNDUS PHOTOGRAPHY W/I&R: CPT

## 2025-04-03 ASSESSMENT — VISUAL ACUITY
OD_CC+: -1
OS_PH_CC: 20/40
OD_CC: 20/20
METHOD: SNELLEN - LINEAR
OS_PH_CC+: +2
OS_CC: 20/40
CORRECTION_TYPE: GLASSES
OS_CC+: -2/+1

## 2025-04-03 ASSESSMENT — CONF VISUAL FIELD
OD_INFERIOR_NASAL_RESTRICTION: 0
METHOD: COUNTING FINGERS
OD_INFERIOR_TEMPORAL_RESTRICTION: 0
OS_INFERIOR_TEMPORAL_RESTRICTION: 0
OS_INFERIOR_NASAL_RESTRICTION: 0
OS_NORMAL: 1
OD_SUPERIOR_NASAL_RESTRICTION: 0
OD_NORMAL: 1
OD_SUPERIOR_TEMPORAL_RESTRICTION: 0
OS_SUPERIOR_TEMPORAL_RESTRICTION: 0
OS_SUPERIOR_NASAL_RESTRICTION: 0

## 2025-04-03 ASSESSMENT — CUP TO DISC RATIO
OD_RATIO: 0.5
OS_RATIO: 0.4

## 2025-04-03 ASSESSMENT — REFRACTION_WEARINGRX
OS_AXIS: 050
OD_CYLINDER: +4.00
OS_CYLINDER: +8.50
OS_ADD: +2.50
OD_SPHERE: -8.25
SPECS_TYPE: PAL
OD_ADD: +2.50
OS_SPHERE: -10.00
OD_AXIS: 003

## 2025-04-03 ASSESSMENT — TONOMETRY
OD_IOP_MMHG: 13
OS_IOP_MMHG: 15
IOP_METHOD: TONOPEN

## 2025-04-03 ASSESSMENT — SLIT LAMP EXAM - LIDS
COMMENTS: NORMAL
COMMENTS: NORMAL

## 2025-04-03 ASSESSMENT — EXTERNAL EXAM - RIGHT EYE: OD_EXAM: NORMAL

## 2025-04-03 ASSESSMENT — EXTERNAL EXAM - LEFT EYE: OS_EXAM: NORMAL

## 2025-04-03 NOTE — PROGRESS NOTES
Ophthalmology Acute Clinic     Chief Complaint(s) and History of Present Illness(es)       Floaters Left Eye               Comments    Patient states noticing increased floaters and blurred vision left eye, not noticing flashes of light. No blurred vision right eye with correction, no floaters or flashes of light right eye. Slight headache. No ocular pain, slight sensitivity to touch left eye. No shade/curtain. Taking Flurometholone once daily left eye and Dorzolamide-timolol twice daily both eyes. Takes PF AT as needed both eyes. No DM.    Aileenderik England on 4/3/2025 at 2:10 PM                       HPI:   Paulino Gomez is a 60 year old male with past ocular history of retinal detachment left eye (s/p SB and PPV),glaucoma left eye, glaucoma suspect right eye who presents to the acute eye clinic for increased floaters of the left eye. Patient was seen on 3/13/25 by  for retina follow up. At that visit no new acute retinal pathology was noted.     Today, patient reports 2 weeks of increased floaters in the left eye. Describes it has recurrent episodes of 2-3 floaters in the left eye throughout the day. Denies any flashes, eye pain, or recent periocular trauma.     ROS    ENT: Normal  Cardiac: Normal (Comment: Has cardioversion scheduled for a-fib)  Derm: Normal  Respiratory: Normal  Muscle/Skel: Normal  Allergic/Immunology: Normal  Neuro: Normal  Psych: Normal  Endocrine: Normal  Heme: Normal  Rheumatologic: Normal  : Normal  GI: Normal  Constitutional: Normal           Past Ocular history:   - Glasses: None  - Contact lens wear: None  - Ocular Surgical History:Pars plana vitrectomy (PPV)/Afx/EL/gas for recurrent RD  (2/13/14), SB/cryo for Retinal detachment  1/2014, scleral buckle removal OS 10/4/17, PKP left eye, CE/IOL left eye.   - Current Eye drops: FML daily, left eye. Cosopt BID,both eyes    PMH:   Past Medical History:   Diagnosis Date    Atrial fibrillation (H)     High cholesterol      Keratoconus     LE>RE    Retinal detachment       FH: Grandfather with history of glaucoma     Review of systems for the eyes was negative other than the pertinent positives/negatives listed in the HPI.      Imaging:   OCT macula (4/3/25):  Right eye: Normal foveal contour, no IRF or SRF  Left eye: Blunted foveal contour, mild ERM,  ellipsoid zone irregularity superotemporally    Optos photos (4/3/25):  Right eye: Normal fundus, chorioretinal pigmentary changes nasally  Left eye: significant peripheral chorioretinal scarring, no visible retinal tears or detachments    Assessment & Plan      Paulino Gomez is a 60 year old male with the following diagnoses:   1. Vitreous floaters, left eye    2. Dry eyes, bilateral     Stable visual acuity and IOP in both eyes. Anterior segment exam of both eyes with no acute pathology noted (PKP in the left eye was clear and in place). Dilated exam of both eyes with no appreciable retinal tears, holes, or detachments. OCT macula and fundus photos were also re-assuring.      PLAN:  -Retinal detachment return precautions discussed, patient demonstrated understanding  -START preservative free artificial tears TID, both eyes  -Follow up in 2 weeks with , retina clinic, for repeat dilated exam and to check on symptoms    Patient disposition:   Return in about 2 weeks (around 4/17/2025) for , VTD, OCT mac.    Patient seen with Dr. Flores     Physician Attestation     Attending Physician Attestation:  Complete documentation of historical and exam elements from today's encounter can be found in the full encounter summary report (not reduplicated in this progress note). I personally obtained the chief complaint(s) and history of present illness. I confirmed and edited as necessary the review of systems, past medical/surgical history, family history, social history, and examination findings as documented by others; and I examined the patient myself. I personally  reviewed the relevant tests, images, and reports as documented above. I personally reviewed the ophthalmic test(s) associated with this encounter, agree with the interpretation(s) as documented by the resident/fellow and have edited the corresponding report(s) as necessary. I formulated and edited as necessary the assessment and plan and discussed the findings and management plan with the patient and any family members present at the time of the visit.      Sheeba Hagen MD  Professor, Department of Ophthalmology and Neuroscience  [facility]      Thank you for entrusting us with your care  Con Benavidez MD  Resident Physician, PGY-2  Department of Ophthalmology

## 2025-04-03 NOTE — TELEPHONE ENCOUNTER
"  Nurse Triage SBAR    Is this a 2nd Level Triage? YES, LICENSED PRACTITIONER REVIEW IS REQUIRED    Situation: Pt with blurry vision to left eye with floaters,  intermittent x 3-4 months, but the last week, has increased more.   Feels like when he had a detatched retina before.    Background:   Assessment:   Blurry vision when the floaters come.  Had to adjust his I pad in brightness and magnification  No pain in the eyes,  Headache in the front  dull,  3/10  Denies confusion, headache, arm or leg weakness, speech problems)  Denies drainage, but hard to tell with 3 gtts that he uses  White of eye is white  H/o cornea transplant    Protocol Recommended Disposition:   Routing to EYE for a call back    Recommendation:     Routed to provider        Reason for Disposition   Blurred vision or visual changes and present now and sudden onset or new (e.g., minutes, hours, days)  (Exception: Seeing floaters / black specks OR previously diagnosed migraine headaches with same symptoms.)    Additional Information   Negative: Complete loss of vision in one or both eyes   Negative: Severe eye pain    Answer Assessment - Initial Assessment Questions  1. DESCRIPTION: \"How has your vision changed?\" (e.g., complete vision loss, blurred vision, double vision, floaters, etc.)      Intermittent floaters x 3-4 months, BUT now much more frequent.  Along with blurry vision as well this last week.   Reminds him of when he had a torn detached retina before  2. LOCATION: \"One or both eyes?\" If one, ask: \"Which eye?\"      Left eye  3. SEVERITY: \"Can you see anything?\" If Yes, ask: \"What can you see?\" (e.g., fine print)      Blurry vision when the floaters come.  Had to adjust his I pad in brightness and magnification  4. ONSET: \"When did this begin?\" \"Did it start suddenly or has this been gradual?\"      Getting worse this last week  5. PATTERN: \"Does this come and go, or has it been constant since it started?\"      Comes and goes  6. PAIN: \"Is " "there any pain in your eye(s)?\"  (Scale 1-10; or mild, moderate, severe)      No pain in the eyes,  Headache in the front  dull,  3/10  7. CONTACTS-GLASSES: \"Do you wear contacts or glasses?\"      glasses  8. CAUSE: \"What do you think is causing this visual problem?\"      Possible detached retina  9. OTHER SYMPTOMS: \"Do you have any other symptoms?\" (e.g., confusion, headache, arm or leg weakness, speech problems)      Denies all   10. PREGNANCY: \"Is there any chance you are pregnant?\" \"When was your last menstrual period?\"        N/a    Protocols used: Vision Loss or Change-A-OH    "

## 2025-04-03 NOTE — NURSING NOTE
Chief Complaints and History of Present Illnesses   Patient presents with    Floaters Left Eye     Chief Complaint(s) and History of Present Illness(es)       Floaters Left Eye               Comments    Patient states noticing increased floaters and blurred vision left eye, not noticing flashes of light. No blurred vision right eye with correction, no floaters or flashes of light right eye. Slight headache. No ocular pain, slight sensitivity to touch left eye. No shade/curtain. Taking Flurometholone once daily left eye and Dorzolamide-timolol twice daily both eyes. Takes PF AT as needed both eyes. No DM.    Aileen England on 4/3/2025 at 2:10 PM

## 2025-04-09 DIAGNOSIS — H35.372 EPIRETINAL MEMBRANE (ERM) OF LEFT EYE: Primary | ICD-10-CM

## 2025-04-21 ENCOUNTER — OFFICE VISIT (OUTPATIENT)
Dept: OPHTHALMOLOGY | Facility: CLINIC | Age: 60
End: 2025-04-21
Attending: OPHTHALMOLOGY
Payer: COMMERCIAL

## 2025-04-21 DIAGNOSIS — H35.372 EPIRETINAL MEMBRANE (ERM) OF LEFT EYE: ICD-10-CM

## 2025-04-21 PROCEDURE — G0463 HOSPITAL OUTPT CLINIC VISIT: HCPCS | Performed by: OPHTHALMOLOGY

## 2025-04-21 PROCEDURE — 92134 CPTRZ OPH DX IMG PST SGM RTA: CPT | Performed by: OPHTHALMOLOGY

## 2025-04-21 ASSESSMENT — VISUAL ACUITY
METHOD: SNELLEN - LINEAR
OS_CC+: -3
CORRECTION_TYPE: GLASSES
OS_CC: 20/25
OD_CC+: -2
OD_CC: 20/20

## 2025-04-21 ASSESSMENT — REFRACTION_WEARINGRX
OD_CYLINDER: +4.00
SPECS_TYPE: PAL
OS_SPHERE: -10.00
OS_AXIS: 050
OD_SPHERE: -8.25
OD_AXIS: 003
OS_CYLINDER: +8.50
OS_ADD: +2.50
OD_ADD: +2.50

## 2025-04-21 ASSESSMENT — CUP TO DISC RATIO
OS_RATIO: 0.4
OD_RATIO: 0.5

## 2025-04-21 ASSESSMENT — TONOMETRY
OS_IOP_MMHG: 11
OD_IOP_MMHG: 16
IOP_METHOD: ICARE

## 2025-04-21 ASSESSMENT — EXTERNAL EXAM - LEFT EYE: OS_EXAM: NORMAL

## 2025-04-21 ASSESSMENT — EXTERNAL EXAM - RIGHT EYE: OD_EXAM: NORMAL

## 2025-04-21 ASSESSMENT — SLIT LAMP EXAM - LIDS
COMMENTS: NORMAL
COMMENTS: NORMAL

## 2025-04-21 NOTE — PROGRESS NOTES
CC: new floaters left eye   Interval history: Here for retina follow up given History of Retinal detachment. New floaters left eye for the past several months, no flashes. No eye pain   s/p SB removal OS 10/4/17. Had new floaters OD 4/27/18 and was diagnosed with PVD in general clinic.   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  No change to his vision.   Past ocular history: was followed by  Dr. Braun for K transplant and dr zuleta for glaucoma     Imaging   Optical Coherence Tomography 04/21/25   Right eye- within normal limits. Good foveal contour  Left eye- trace Epiretinal membrane,slightly blunted foveal contour    retinal nerve fiber layer 03/13/25   Right eye: diffuse thinning-- stable  Left eye: sup and inf thinning -- stable    OVF 24-2 04/26/23   Right eye: central spots of decreased sensitivity MD -2.3  Left eye: scattered spots of decreased sensitivity MD -5.5    ASSESSMENT/PLAN  Paulino Gomez is a 58 year old male with the following ophthalmologic problems:    # history of retinal detachment, partial, with single defect, left   -s/p Pars plana vitrectomy (PPV)/Afx/EL/gas for recurrent RD  (2/13/14)  -S/p SB/cryo for Retinal detachment  1/2014  # S/p scleral buckle removal OS 10/4/17   -Due to exposed scleral buckle  - looks good, retina flat    # history of PVD, right eye  -reports stable floaters OD  -Retinal detachment/retinal tear precautions discussed with patient. 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   Cup to disc ratio asymmetry L>R.   Thinning on retinal nerve fiber layer both eyes.   Visual fields nonspecific.   Grandfather with history of glaucoma   maximum intraocular pressure 24/29.  cosopt twice a day both eyes.   Retinal nerve fiber layer 03/13/25 stable   For  glaucoma Follow-up patient will followed by Lucía     # Keratoconus both eyes   -s/p penetrating keratoplasty (PK) left eye (12/2015)  - followed by cornea- Dr. hartman  - on Predforte  once a day     # Pseudophakia left eye   - s/p ceiol (Dr Cabezas) 5/3/2017  - Posterior capsular opacity (PCO)- consider yag in the future if patient becomes more symptomatic      return to clinic:  follow up with Glaucoma as scheduled  Retina in 1 year with OCT Mac.   Retina detachment precautions were discussed with the patient (presence or increased in flashes, floaters or a curtain in the visual field) and was asked to return if any of the those occur     ~~~~~~~~~~~~~~~~~~~~~~~~~~~~~~~~~~   Complete documentation of historical and exam elements from today's encounter can be found in the full encounter summary report (not reduplicated in this progress note).  I personally obtained the chief complaint(s) and history of present illness.  I confirmed and edited as necessary the review of systems, past medical/surgical history, family history, social history, and examination findings as documented by others; and I examined the patient myself.  I personally reviewed the relevant tests, images, and reports as documented above.  I formulated and edited as necessary the assessment and plan and discussed the findings and management plan with the patient and family    Lina Novak MD  .  Retina Service   Department of Ophthalmology and Visual Neurosciences   [facility]  Phone: [phone]   Fax: [phone]

## 2025-04-21 NOTE — NURSING NOTE
"Chief Complaints and History of Present Illnesses   Patient presents with    Floaters Follow-Up     2 week follow up for Vitreous floaters left eye.     Patient states still noticing a lot of floaters in left eye. Denies changes over the last couple weeks. Denies cobwebs. \"Floaters look like amoebas.\" Patient notes some intermittent tenderness in left eye. Denies visual acuity changes.     Ciara Guthrie, COT 7:43 AM 04/21/2025       Chief Complaint(s) and History of Present Illness(es)       Floaters Follow-Up              Laterality: left eye    Duration: weeks    Associated symptoms: Negative for flashes, shade, peripheral vision loss and blurred vision    Comments: 2 week follow up for Vitreous floaters left eye.     Patient states still noticing a lot of floaters in left eye. Denies changes over the last couple weeks. Denies cobwebs. \"Floaters look like amoebas.\" Patient notes some intermittent tenderness in left eye. Denies visual acuity changes.     Ciara Guthrie, COT 7:43 AM 04/21/2025                      "

## (undated) DEVICE — EYE SHIELD PLASTIC

## (undated) DEVICE — SU PLAIN 6-0 G-1DA 18" 770G

## (undated) DEVICE — PACK VITRECTOMY PQ15VI57E

## (undated) DEVICE — BLADE KNIFE BEAVER 60DEG BEAVER6600

## (undated) DEVICE — LINEN TOWEL PACK X5 5464

## (undated) DEVICE — TAPE MICROPORE 2"X1.5YD 1530S-2

## (undated) DEVICE — TAPE MICROPORE 1"X1.5YD 1530S-1

## (undated) DEVICE — GLOVE PROTEXIS MICRO 6.0  2D73PM60

## (undated) DEVICE — Device

## (undated) DEVICE — EYE PREP BETADINE 5% SOLUTION 30ML 0065-0411-30

## (undated) DEVICE — PACK CATARACT CUSTOM ASC SEY15CPUMC

## (undated) DEVICE — SU SILK 4-0 TIE 24" SA73H

## (undated) DEVICE — EYE PACK PHACO AVS W/2.6 SLEEVE BLV2260000

## (undated) DEVICE — EYE PACK BVI READYPAK KIT #3

## (undated) DEVICE — SU ETHILON 10-0 CS160-6 12" 9000G

## (undated) DEVICE — EYE KNIFE SLIT XSTAR VISITEC 2.8MM 45DEG 373728

## (undated) DEVICE — BLADE KNIFE BEAVER MINI BEAVER6400

## (undated) DEVICE — SU SILK 2-0 TIE 18' A185H

## (undated) DEVICE — GLOVE PROTEXIS MICRO 8.0  2D73PM80

## (undated) DEVICE — EYE NDL STELLARIS STRAIGHT BL3318S

## (undated) DEVICE — SYR 05ML SLIP TIP W/O NDL 309647

## (undated) DEVICE — EYE SOL BSS 500ML

## (undated) DEVICE — EYE NDL RETROBULBAR 25GA 1.5" 581275

## (undated) DEVICE — GLOVE PROTEXIS MICRO 7.0  2D73PM70

## (undated) RX ORDER — GABAPENTIN 300 MG/1
CAPSULE ORAL
Status: DISPENSED
Start: 2017-05-03

## (undated) RX ORDER — CEFAZOLIN SODIUM 500 MG/2.2ML
INJECTION, POWDER, FOR SOLUTION INTRAMUSCULAR; INTRAVENOUS
Status: DISPENSED
Start: 2017-10-04

## (undated) RX ORDER — FENTANYL CITRATE 50 UG/ML
INJECTION, SOLUTION INTRAMUSCULAR; INTRAVENOUS
Status: DISPENSED
Start: 2017-10-04

## (undated) RX ORDER — ACETAMINOPHEN 325 MG/1
TABLET ORAL
Status: DISPENSED
Start: 2017-05-03

## (undated) RX ORDER — FENTANYL CITRATE 50 UG/ML
INJECTION, SOLUTION INTRAMUSCULAR; INTRAVENOUS
Status: DISPENSED
Start: 2017-05-03

## (undated) RX ORDER — DEXAMETHASONE SODIUM PHOSPHATE 4 MG/ML
INJECTION, SOLUTION INTRA-ARTICULAR; INTRALESIONAL; INTRAMUSCULAR; INTRAVENOUS; SOFT TISSUE
Status: DISPENSED
Start: 2017-10-04

## (undated) RX ORDER — TETRACAINE HYDROCHLORIDE 5 MG/ML
SOLUTION OPHTHALMIC
Status: DISPENSED
Start: 2017-10-04

## (undated) RX ORDER — NEOMYCIN SULFATE, POLYMYXIN B SULFATE AND GRAMICIDIN 1.75; 10000; .025 MG/ML; [USP'U]/ML; MG/ML
SOLUTION/ DROPS OPHTHALMIC
Status: DISPENSED
Start: 2017-10-04

## (undated) RX ORDER — ATROPINE SULFATE 10 MG/ML
SOLUTION/ DROPS OPHTHALMIC
Status: DISPENSED
Start: 2017-10-04